# Patient Record
Sex: FEMALE | Race: WHITE | NOT HISPANIC OR LATINO
[De-identification: names, ages, dates, MRNs, and addresses within clinical notes are randomized per-mention and may not be internally consistent; named-entity substitution may affect disease eponyms.]

---

## 2017-02-21 ENCOUNTER — LABORATORY RESULT (OUTPATIENT)
Age: 32
End: 2017-02-21

## 2017-02-21 ENCOUNTER — APPOINTMENT (OUTPATIENT)
Dept: INTERNAL MEDICINE | Facility: CLINIC | Age: 32
End: 2017-02-21

## 2017-02-21 ENCOUNTER — OUTPATIENT (OUTPATIENT)
Dept: OUTPATIENT SERVICES | Facility: HOSPITAL | Age: 32
LOS: 1 days | End: 2017-02-21
Payer: MEDICAID

## 2017-02-21 VITALS
HEIGHT: 67 IN | DIASTOLIC BLOOD PRESSURE: 80 MMHG | BODY MASS INDEX: 34.84 KG/M2 | SYSTOLIC BLOOD PRESSURE: 120 MMHG | WEIGHT: 222 LBS

## 2017-02-21 VITALS — HEART RATE: 72 BPM

## 2017-02-21 DIAGNOSIS — L30.9 DERMATITIS, UNSPECIFIED: ICD-10-CM

## 2017-02-21 DIAGNOSIS — Z98.89 OTHER SPECIFIED POSTPROCEDURAL STATES: Chronic | ICD-10-CM

## 2017-02-21 DIAGNOSIS — I10 ESSENTIAL (PRIMARY) HYPERTENSION: ICD-10-CM

## 2017-02-21 LAB — HBA1C BLD-MCNC: 5.3 % — SIGNIFICANT CHANGE UP (ref 4–5.6)

## 2017-02-21 PROCEDURE — 87389 HIV-1 AG W/HIV-1&-2 AB AG IA: CPT

## 2017-02-21 PROCEDURE — 86780 TREPONEMA PALLIDUM: CPT

## 2017-02-21 PROCEDURE — 83036 HEMOGLOBIN GLYCOSYLATED A1C: CPT

## 2017-02-21 PROCEDURE — 86803 HEPATITIS C AB TEST: CPT

## 2017-02-21 PROCEDURE — 86706 HEP B SURFACE ANTIBODY: CPT

## 2017-02-21 PROCEDURE — 82306 VITAMIN D 25 HYDROXY: CPT

## 2017-02-21 PROCEDURE — 86704 HEP B CORE ANTIBODY TOTAL: CPT

## 2017-02-21 PROCEDURE — 84443 ASSAY THYROID STIM HORMONE: CPT

## 2017-02-21 PROCEDURE — 80061 LIPID PANEL: CPT

## 2017-02-21 PROCEDURE — 87340 HEPATITIS B SURFACE AG IA: CPT

## 2017-02-21 PROCEDURE — G0463: CPT

## 2017-02-22 LAB
24R-OH-CALCIDIOL SERPL-MCNC: 16.7 NG/ML — LOW (ref 30–100)
CHOLEST SERPL-MCNC: 185 MG/DL — SIGNIFICANT CHANGE UP (ref 10–199)
HBV CORE AB SER-ACNC: SIGNIFICANT CHANGE UP
HBV SURFACE AB SER-ACNC: ABNORMAL
HBV SURFACE AG SER-ACNC: SIGNIFICANT CHANGE UP
HCV AB S/CO SERPL IA: 0.1 S/CO — SIGNIFICANT CHANGE UP
HCV AB SERPL-IMP: SIGNIFICANT CHANGE UP
HDLC SERPL-MCNC: 53 MG/DL — SIGNIFICANT CHANGE UP (ref 40–125)
HIV 1+2 AB+HIV1 P24 AG SERPL QL IA: SIGNIFICANT CHANGE UP
LIPID PNL WITH DIRECT LDL SERPL: 117 MG/DL — SIGNIFICANT CHANGE UP
T PALLIDUM AB TITR SER: NEGATIVE — SIGNIFICANT CHANGE UP
T4 FREE+ TSH PNL SERPL: 2.12 UIU/ML — SIGNIFICANT CHANGE UP (ref 0.27–4.2)
TOTAL CHOLESTEROL/HDL RATIO MEASUREMENT: 3.5 RATIO — SIGNIFICANT CHANGE UP (ref 3.3–7.1)
TRIGL SERPL-MCNC: 75 MG/DL — SIGNIFICANT CHANGE UP (ref 10–149)

## 2017-02-23 LAB
C TRACH RRNA SPEC QL NAA+PROBE: SIGNIFICANT CHANGE UP
N GONORRHOEA RRNA SPEC QL NAA+PROBE: SIGNIFICANT CHANGE UP
SPECIMEN SOURCE: SIGNIFICANT CHANGE UP

## 2017-02-24 DIAGNOSIS — F34.1 DYSTHYMIC DISORDER: ICD-10-CM

## 2017-02-24 DIAGNOSIS — E55.9 VITAMIN D DEFICIENCY, UNSPECIFIED: ICD-10-CM

## 2017-02-24 DIAGNOSIS — Z00.00 ENCOUNTER FOR GENERAL ADULT MEDICAL EXAMINATION WITHOUT ABNORMAL FINDINGS: ICD-10-CM

## 2017-02-24 DIAGNOSIS — L30.9 DERMATITIS, UNSPECIFIED: ICD-10-CM

## 2017-02-24 DIAGNOSIS — F10.20 ALCOHOL DEPENDENCE, UNCOMPLICATED: ICD-10-CM

## 2017-04-12 ENCOUNTER — CHART COPY (OUTPATIENT)
Age: 32
End: 2017-04-12

## 2017-09-26 ENCOUNTER — OUTPATIENT (OUTPATIENT)
Dept: OUTPATIENT SERVICES | Facility: HOSPITAL | Age: 32
LOS: 1 days | End: 2017-09-26
Payer: MEDICAID

## 2017-09-26 ENCOUNTER — APPOINTMENT (OUTPATIENT)
Dept: INTERNAL MEDICINE | Facility: CLINIC | Age: 32
End: 2017-09-26
Payer: MEDICAID

## 2017-09-26 ENCOUNTER — LABORATORY RESULT (OUTPATIENT)
Age: 32
End: 2017-09-26

## 2017-09-26 VITALS
OXYGEN SATURATION: 98 % | HEART RATE: 97 BPM | DIASTOLIC BLOOD PRESSURE: 74 MMHG | SYSTOLIC BLOOD PRESSURE: 112 MMHG | HEIGHT: 67 IN | WEIGHT: 226 LBS | BODY MASS INDEX: 35.47 KG/M2

## 2017-09-26 DIAGNOSIS — Z98.89 OTHER SPECIFIED POSTPROCEDURAL STATES: Chronic | ICD-10-CM

## 2017-09-26 PROCEDURE — 82306 VITAMIN D 25 HYDROXY: CPT

## 2017-09-26 PROCEDURE — G0463: CPT

## 2017-09-26 PROCEDURE — 99213 OFFICE O/P EST LOW 20 MIN: CPT | Mod: GE

## 2017-09-27 DIAGNOSIS — I10 ESSENTIAL (PRIMARY) HYPERTENSION: ICD-10-CM

## 2017-09-27 LAB — 24R-OH-CALCIDIOL SERPL-MCNC: 26.1 NG/ML — LOW (ref 30–80)

## 2017-10-03 DIAGNOSIS — E55.9 VITAMIN D DEFICIENCY, UNSPECIFIED: ICD-10-CM

## 2017-12-06 ENCOUNTER — OTHER (OUTPATIENT)
Age: 32
End: 2017-12-06

## 2017-12-31 ENCOUNTER — EMERGENCY (EMERGENCY)
Facility: HOSPITAL | Age: 32
LOS: 0 days | Discharge: HOME | End: 2017-12-31
Attending: EMERGENCY MEDICINE | Admitting: EMERGENCY MEDICINE
Payer: MEDICAID

## 2017-12-31 VITALS
TEMPERATURE: 97 F | HEART RATE: 113 BPM | SYSTOLIC BLOOD PRESSURE: 133 MMHG | OXYGEN SATURATION: 99 % | RESPIRATION RATE: 18 BRPM | DIASTOLIC BLOOD PRESSURE: 71 MMHG

## 2017-12-31 VITALS — WEIGHT: 210.1 LBS | HEIGHT: 67 IN

## 2017-12-31 DIAGNOSIS — R06.02 SHORTNESS OF BREATH: ICD-10-CM

## 2017-12-31 DIAGNOSIS — R07.9 CHEST PAIN, UNSPECIFIED: ICD-10-CM

## 2017-12-31 DIAGNOSIS — Z98.89 OTHER SPECIFIED POSTPROCEDURAL STATES: Chronic | ICD-10-CM

## 2017-12-31 DIAGNOSIS — F43.9 REACTION TO SEVERE STRESS, UNSPECIFIED: ICD-10-CM

## 2017-12-31 DIAGNOSIS — F41.9 ANXIETY DISORDER, UNSPECIFIED: ICD-10-CM

## 2017-12-31 DIAGNOSIS — F32.9 MAJOR DEPRESSIVE DISORDER, SINGLE EPISODE, UNSPECIFIED: ICD-10-CM

## 2017-12-31 PROCEDURE — 93010 ELECTROCARDIOGRAM REPORT: CPT

## 2017-12-31 PROCEDURE — 71020: CPT | Mod: 26

## 2017-12-31 PROCEDURE — 99285 EMERGENCY DEPT VISIT HI MDM: CPT

## 2017-12-31 RX ORDER — IBUPROFEN 200 MG
600 TABLET ORAL ONCE
Qty: 0 | Refills: 0 | Status: COMPLETED | OUTPATIENT
Start: 2017-12-31 | End: 2017-12-31

## 2017-12-31 RX ADMIN — Medication 600 MILLIGRAM(S): at 18:14
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
## 2017-12-31 NOTE — ED STATDOCS - NS_ ATTENDINGSCRIBEDETAILS _ED_A_ED_FT
I, Sumeet Fernández MD,  performed the initial face to face bedside interview with this patient regarding history of present illness, review of symptoms and relevant past medical, social and family history.  I completed an independent physical examination.    The history, relevant review of systems, past medical and surgical history, medical decision making, and physical examination was documented by the scribe in my presence and I attest to the accuracy of the documentation.
F32.3
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
## 2018-01-30 ENCOUNTER — EMERGENCY (EMERGENCY)
Facility: HOSPITAL | Age: 33
LOS: 0 days | Discharge: HOME | End: 2018-01-30
Attending: EMERGENCY MEDICINE | Admitting: EMERGENCY MEDICINE
Payer: MEDICAID

## 2018-01-30 VITALS
SYSTOLIC BLOOD PRESSURE: 121 MMHG | HEART RATE: 89 BPM | DIASTOLIC BLOOD PRESSURE: 79 MMHG | TEMPERATURE: 98 F | OXYGEN SATURATION: 100 %

## 2018-01-30 VITALS
HEIGHT: 67 IN | DIASTOLIC BLOOD PRESSURE: 91 MMHG | WEIGHT: 169.98 LBS | SYSTOLIC BLOOD PRESSURE: 147 MMHG | OXYGEN SATURATION: 100 % | RESPIRATION RATE: 18 BRPM | TEMPERATURE: 98 F | HEART RATE: 102 BPM

## 2018-01-30 DIAGNOSIS — F32.9 MAJOR DEPRESSIVE DISORDER, SINGLE EPISODE, UNSPECIFIED: ICD-10-CM

## 2018-01-30 DIAGNOSIS — F41.9 ANXIETY DISORDER, UNSPECIFIED: ICD-10-CM

## 2018-01-30 DIAGNOSIS — Z98.89 OTHER SPECIFIED POSTPROCEDURAL STATES: Chronic | ICD-10-CM

## 2018-01-30 PROCEDURE — 71046 X-RAY EXAM CHEST 2 VIEWS: CPT | Mod: 26

## 2018-01-30 PROCEDURE — 99283 EMERGENCY DEPT VISIT LOW MDM: CPT

## 2018-01-30 PROCEDURE — 93010 ELECTROCARDIOGRAM REPORT: CPT
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
## 2018-01-30 NOTE — ED STATDOCS - PROGRESS NOTE DETAILS
JW Sx resolved in the Ed no chest pain or SOB.  EKG reveals no evidence of ACS. No fever or EKG findings suggestive of pericarditis, or myocarditis.  No Lamas's Triad or signs of pericardial tamponade.  No clinical findings suggestive of  pulmonary embolism.  CXR normal with clear lungs, normal mediastinum, and bilateral breath sounds.  No findings suggestive of pneumothorax, pneumonia, or esophageal perforation.  Historically pain not abrupt in onset, not tearing or ripping, pulses are symmetric, no murmur noted, no clinical findings suggestive of aortic dissection. Physical Exam: General: alert and oriented x3 in no acute distress.  Cardiovascular: Regular rate and rhythm, S1 and S2 normal.  No murmurs, rubs, or gallops.  Pulses equal bilaterally.  No carotid bruits.  No peripheral edema or JVD.  Respiratory: No respiratory distress.  Lungs clear to auscultation bilaterally without adventitial breath sounds.  Abdominal: Non-distended, normal bowel sounds in all four quadrants, non tender to percussion and palpation in all four quadrants.  No mass, rigidity, or guarding.  Extremities: No sign of trauma, inflammation, or effusion.  Full range of motion in the cervical, lumbar, and thoracic spine and all four extremities without limitation.  No peripheral edema.  Neurological: AOx3 NAD.  Cranial nerves I-XII intact.  Normal gross motor and sensory function. Pt ambulatory. 5/5 muscular strength with normal bulk and tone.  DTRs 2+ bilaterally.  No dysmetria or dysdiadochokinesia.  No focal neurological deficit.  No neck stiffness, photophobia, rash, or meningismus.   A/P Anxiety.  EKG/CXR WNL. Pt instructed to continue normal medication regimen and follow up with psychiatrist in 24 hours. Pt instructed to follow up with cardiologist in the next 24 hours for a repeat evaluation.  I reviewed the alarm symptoms of this patient's diagnosis including chest pain, SOB, nausea, and discussed criteria for their return to the emergency department.  I instructed the patient to return to the emergency department with any alarm symptoms for their specific diagnosis including any worsening symptoms, and any other concerns.  I instructed this patient to call their primary doctor today, to inform them of their visit to the emergency department, and to obtain a repeat evaluation in the next 24 hours.  This patient understood and agreed with our plan for follow up and felt safe returning home.  At the time of discharge this patient remained in stable condition, in no acute distress, with stable vital signs. JW Sx resolved in the Ed no chest pain or SOB.  Pt denies SI, HI.  Pt denies injuries.  Pt feels safe returning home.  EKG reveals no evidence of ACS. No fever or EKG findings suggestive of pericarditis, or myocarditis.  No Lamas's Triad or signs of pericardial tamponade.  No clinical findings suggestive of  pulmonary embolism.  CXR normal with clear lungs, normal mediastinum, and bilateral breath sounds.  No findings suggestive of pneumothorax, pneumonia, or esophageal perforation.  Historically pain not abrupt in onset, not tearing or ripping, pulses are symmetric, no murmur noted, no clinical findings suggestive of aortic dissection. Physical Exam: General: alert and oriented x3 in no acute distress.  Cardiovascular: Regular rate and rhythm, S1 and S2 normal.  No murmurs, rubs, or gallops.  Pulses equal bilaterally.  No carotid bruits.  No peripheral edema or JVD.  Respiratory: No respiratory distress.  Lungs clear to auscultation bilaterally without adventitial breath sounds.  Abdominal: Non-distended, normal bowel sounds in all four quadrants, non tender to percussion and palpation in all four quadrants.  No mass, rigidity, or guarding.  Extremities: No sign of trauma, inflammation, or effusion.  Full range of motion in the cervical, lumbar, and thoracic spine and all four extremities without limitation.  No peripheral edema.  Neurological: AOx3 NAD.  Cranial nerves I-XII intact.  Normal gross motor and sensory function. Pt ambulatory. 5/5 muscular strength with normal bulk and tone.  DTRs 2+ bilaterally.  No dysmetria or dysdiadochokinesia.  No focal neurological deficit.  No neck stiffness, photophobia, rash, or meningismus.   A/P Anxiety.  EKG/CXR WNL. Pt instructed to continue normal medication regimen and follow up with psychiatrist in 24 hours.
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
## 2018-02-20 ENCOUNTER — APPOINTMENT (OUTPATIENT)
Dept: INTERNAL MEDICINE | Facility: CLINIC | Age: 33
End: 2018-02-20
Payer: MEDICAID

## 2018-02-20 ENCOUNTER — OUTPATIENT (OUTPATIENT)
Dept: OUTPATIENT SERVICES | Facility: HOSPITAL | Age: 33
LOS: 1 days | End: 2018-02-20
Payer: MEDICAID

## 2018-02-20 VITALS
HEART RATE: 77 BPM | BODY MASS INDEX: 35.79 KG/M2 | WEIGHT: 228 LBS | DIASTOLIC BLOOD PRESSURE: 78 MMHG | HEIGHT: 67 IN | SYSTOLIC BLOOD PRESSURE: 118 MMHG | OXYGEN SATURATION: 98 %

## 2018-02-20 DIAGNOSIS — I10 ESSENTIAL (PRIMARY) HYPERTENSION: ICD-10-CM

## 2018-02-20 DIAGNOSIS — F34.1 DYSTHYMIC DISORDER: ICD-10-CM

## 2018-02-20 DIAGNOSIS — E55.9 VITAMIN D DEFICIENCY, UNSPECIFIED: ICD-10-CM

## 2018-02-20 DIAGNOSIS — F10.21 ALCOHOL DEPENDENCE, IN REMISSION: ICD-10-CM

## 2018-02-20 DIAGNOSIS — Z98.89 OTHER SPECIFIED POSTPROCEDURAL STATES: Chronic | ICD-10-CM

## 2018-02-20 DIAGNOSIS — E66.9 OBESITY, UNSPECIFIED: ICD-10-CM

## 2018-02-20 PROCEDURE — 99213 OFFICE O/P EST LOW 20 MIN: CPT | Mod: GE

## 2018-02-20 PROCEDURE — G0463: CPT

## 2018-02-21 PROBLEM — F10.21 HISTORY OF ALCOHOLISM: Status: ACTIVE | Noted: 2018-02-21

## 2018-03-06 ENCOUNTER — APPOINTMENT (OUTPATIENT)
Dept: INTERNAL MEDICINE | Facility: CLINIC | Age: 33
End: 2018-03-06

## 2018-03-06 ENCOUNTER — OUTPATIENT (OUTPATIENT)
Dept: OUTPATIENT SERVICES | Facility: HOSPITAL | Age: 33
LOS: 1 days | End: 2018-03-06
Payer: MEDICAID

## 2018-03-06 DIAGNOSIS — Z98.89 OTHER SPECIFIED POSTPROCEDURAL STATES: Chronic | ICD-10-CM

## 2018-03-06 DIAGNOSIS — I10 ESSENTIAL (PRIMARY) HYPERTENSION: ICD-10-CM

## 2018-03-06 PROCEDURE — G0463: CPT

## 2018-03-20 DIAGNOSIS — E66.9 OBESITY, UNSPECIFIED: ICD-10-CM

## 2018-06-12 ENCOUNTER — OUTPATIENT (OUTPATIENT)
Dept: OUTPATIENT SERVICES | Facility: HOSPITAL | Age: 33
LOS: 1 days | End: 2018-06-12
Payer: MEDICAID

## 2018-06-12 ENCOUNTER — APPOINTMENT (OUTPATIENT)
Dept: INTERNAL MEDICINE | Facility: CLINIC | Age: 33
End: 2018-06-12

## 2018-06-12 DIAGNOSIS — Z98.89 OTHER SPECIFIED POSTPROCEDURAL STATES: Chronic | ICD-10-CM

## 2018-06-12 DIAGNOSIS — I10 ESSENTIAL (PRIMARY) HYPERTENSION: ICD-10-CM

## 2018-06-12 PROCEDURE — G0463: CPT

## 2018-06-18 DIAGNOSIS — E66.9 OBESITY, UNSPECIFIED: ICD-10-CM

## 2018-08-14 ENCOUNTER — OUTPATIENT (OUTPATIENT)
Dept: OUTPATIENT SERVICES | Facility: HOSPITAL | Age: 33
LOS: 1 days | End: 2018-08-14
Payer: MEDICAID

## 2018-08-14 ENCOUNTER — APPOINTMENT (OUTPATIENT)
Dept: INTERNAL MEDICINE | Facility: CLINIC | Age: 33
End: 2018-08-14

## 2018-08-14 DIAGNOSIS — Z98.89 OTHER SPECIFIED POSTPROCEDURAL STATES: Chronic | ICD-10-CM

## 2018-08-14 DIAGNOSIS — I10 ESSENTIAL (PRIMARY) HYPERTENSION: ICD-10-CM

## 2018-08-14 PROCEDURE — G0463: CPT

## 2018-08-20 DIAGNOSIS — E66.9 OBESITY, UNSPECIFIED: ICD-10-CM

## 2018-10-24 ENCOUNTER — TRANSCRIPTION ENCOUNTER (OUTPATIENT)
Age: 33
End: 2018-10-24

## 2018-10-30 ENCOUNTER — APPOINTMENT (OUTPATIENT)
Dept: DERMATOLOGY | Facility: CLINIC | Age: 33
End: 2018-10-30
Payer: MEDICAID

## 2018-10-30 VITALS — BODY MASS INDEX: 35.79 KG/M2 | WEIGHT: 228 LBS | HEIGHT: 67 IN

## 2018-10-30 DIAGNOSIS — L23.9 ALLERGIC CONTACT DERMATITIS, UNSPECIFIED CAUSE: ICD-10-CM

## 2018-10-30 PROCEDURE — 99203 OFFICE O/P NEW LOW 30 MIN: CPT

## 2018-10-30 RX ORDER — HYDROCORTISONE 25 MG/G
2.5 CREAM TOPICAL TWICE DAILY
Qty: 1 | Refills: 0 | Status: DISCONTINUED | COMMUNITY
Start: 2017-02-21 | End: 2018-10-30

## 2018-11-02 ENCOUNTER — APPOINTMENT (OUTPATIENT)
Dept: INTERNAL MEDICINE | Facility: CLINIC | Age: 33
End: 2018-11-02

## 2018-12-11 ENCOUNTER — APPOINTMENT (OUTPATIENT)
Dept: INTERNAL MEDICINE | Facility: CLINIC | Age: 33
End: 2018-12-11

## 2018-12-19 ENCOUNTER — TRANSCRIPTION ENCOUNTER (OUTPATIENT)
Age: 33
End: 2018-12-19

## 2018-12-24 ENCOUNTER — TRANSCRIPTION ENCOUNTER (OUTPATIENT)
Age: 33
End: 2018-12-24

## 2018-12-26 NOTE — DISCUSSION/SUMMARY
[Home] : patient was discharged to home [FreeTextEntry1] : Prescribed amoxicillin 875 mg q12hrs 10 day course (from Urgent care). \par Given tessalon perles and flonase suspension also PRN for symptoms. \par \par Can follow up as needed with PMD.

## 2019-01-13 ENCOUNTER — TRANSCRIPTION ENCOUNTER (OUTPATIENT)
Age: 34
End: 2019-01-13

## 2019-01-14 NOTE — DISCUSSION/SUMMARY
[FreeTextEntry1] : Urgent Care visit 1/13\par Pt presented with 4 wks of moderate cough, sore throat, and nasal congestion. Previously seen at  12/24 and given amoxicillin. Pt started on Flonase, Tessalon Perles, and Proventil HFA Aerosol Solution PRN.  X ray unremarkable. Increase oral fluid Tylenol or Motrin as needed humidifier at night.\par Pt to followup with PCP.

## 2019-03-12 ENCOUNTER — OUTPATIENT (OUTPATIENT)
Dept: OUTPATIENT SERVICES | Facility: HOSPITAL | Age: 34
LOS: 1 days | End: 2019-03-12
Payer: MEDICAID

## 2019-03-12 ENCOUNTER — APPOINTMENT (OUTPATIENT)
Dept: INTERNAL MEDICINE | Facility: CLINIC | Age: 34
End: 2019-03-12
Payer: MEDICAID

## 2019-03-12 DIAGNOSIS — Z98.89 OTHER SPECIFIED POSTPROCEDURAL STATES: Chronic | ICD-10-CM

## 2019-03-12 DIAGNOSIS — I10 ESSENTIAL (PRIMARY) HYPERTENSION: ICD-10-CM

## 2019-03-12 PROCEDURE — 97803 MED NUTRITION INDIV SUBSEQ: CPT

## 2019-03-13 DIAGNOSIS — E66.9 OBESITY, UNSPECIFIED: ICD-10-CM

## 2019-03-21 ENCOUNTER — OUTPATIENT (OUTPATIENT)
Dept: OUTPATIENT SERVICES | Facility: HOSPITAL | Age: 34
LOS: 1 days | End: 2019-03-21
Payer: MEDICAID

## 2019-03-21 ENCOUNTER — APPOINTMENT (OUTPATIENT)
Dept: INTERNAL MEDICINE | Facility: CLINIC | Age: 34
End: 2019-03-21
Payer: MEDICAID

## 2019-03-21 VITALS — SYSTOLIC BLOOD PRESSURE: 105 MMHG | DIASTOLIC BLOOD PRESSURE: 80 MMHG

## 2019-03-21 DIAGNOSIS — Z81.1 FAMILY HISTORY OF ALCOHOL ABUSE AND DEPENDENCE: ICD-10-CM

## 2019-03-21 DIAGNOSIS — Z98.89 OTHER SPECIFIED POSTPROCEDURAL STATES: Chronic | ICD-10-CM

## 2019-03-21 DIAGNOSIS — I10 ESSENTIAL (PRIMARY) HYPERTENSION: ICD-10-CM

## 2019-03-21 DIAGNOSIS — E66.9 OBESITY, UNSPECIFIED: ICD-10-CM

## 2019-03-21 PROCEDURE — G0463: CPT

## 2019-03-21 PROCEDURE — 99213 OFFICE O/P EST LOW 20 MIN: CPT | Mod: GE

## 2019-03-21 RX ORDER — ESCITALOPRAM OXALATE 20 MG/1
20 TABLET, FILM COATED ORAL
Refills: 0 | Status: DISCONTINUED | COMMUNITY
End: 2019-03-21

## 2019-03-21 RX ORDER — HYDROCORTISONE 25 MG/G
2.5 OINTMENT TOPICAL TWICE DAILY
Qty: 30 | Refills: 1 | Status: DISCONTINUED | COMMUNITY
Start: 2018-10-30 | End: 2019-03-21

## 2019-03-21 RX ORDER — TRIAMCINOLONE ACETONIDE 1 MG/G
0.1 OINTMENT TOPICAL
Qty: 1 | Refills: 0 | Status: DISCONTINUED | COMMUNITY
Start: 2018-10-30 | End: 2019-03-21
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
## 2019-03-21 NOTE — PHYSICAL EXAM
[No Acute Distress] : no acute distress [Normal Sclera/Conjunctiva] : normal sclera/conjunctiva [EOMI] : extra ocular movement intact [Normal Oropharynx] : the oropharynx was normal [Supple] : the neck was supple [No Respiratory Distress] : no respiratory distress [Clear to Auscultation] : lungs were clear to auscultation bilaterally [Normal Rate] : heart rate was normal  [Regular Rhythm] : with a regular rhythm [Normal S1, S2] : normal S1 and S2 [No Murmurs] : no murmurs heard [No Edema] : there was no peripheral edema [Non Tender] : non-tender [Soft] : abdomen soft [Not Distended] : not distended [Normal Supraclavicular Nodes] : no supraclavicular lymphadenopathy [Normal Post Cervical Nodes] : no posterior cervical lymphadenopathy [Normal Anterior Cervical Nodes] : no anterior cervical lymphadenopathy [No Spinal Tenderness] : no spinal tenderness [Normal Gait] : normal gait [No Rash] : no rash [No Motor Deficits] : the motor exam was normal [No Gross Sensory Deficits] : no gross sensory deficits [Normal Affect] : the affect was normal [Normal Mood] : the mood was normal

## 2019-03-21 NOTE — REVIEW OF SYSTEMS
[Fever] : no fever [Chills] : no chills [Eye Pain] : no eye pain [Earache] : no earache [Hoarseness] : no hoarseness [Chest Pain] : no chest pain [Lower Ext Edema] : no lower extremity edema [Shortness Of Breath] : no shortness of breath [Cough] : no cough [Abdominal Pain] : no abdominal pain [Nausea] : no nausea [Constipation] : no constipation [Diarrhea] : no diarrhea [Dysuria] : no dysuria [Joint Pain] : no joint pain [Skin Rash] : no skin rash [Headache] : no headaches [Suicidal] : not suicidal [Easy Bleeding] : no tendency for easy bleeding

## 2019-03-21 NOTE — ASSESSMENT
[FreeTextEntry1] : 33F w/ Autism, dysthymia on escitalopram, prior alcohol abuse, vitD deficiency presents for CPE:\par \par #dysthymia- stable\par -c/w escitalopram\par \par #h/o ETOH abuse- last drink 2010 \par -c/w outpt psych f/u\par \par #Obesity\par -continue with exercise at gym, encouraged patient to limit fast food in diet\par -nutritionist follow up\par \par #HCM\par -HIV and hep C negative \par -pap with HPV negative one year ago, c/w outpt gyn f/u and repeat  pap in 4 years \par -declined flu shot\par \par RTC 6 months

## 2019-03-22 DIAGNOSIS — E55.9 VITAMIN D DEFICIENCY, UNSPECIFIED: ICD-10-CM

## 2019-03-22 DIAGNOSIS — F10.20 ALCOHOL DEPENDENCE, UNCOMPLICATED: ICD-10-CM

## 2019-03-22 DIAGNOSIS — Z81.1 FAMILY HISTORY OF ALCOHOL ABUSE AND DEPENDENCE: ICD-10-CM

## 2019-03-22 DIAGNOSIS — E66.9 OBESITY, UNSPECIFIED: ICD-10-CM

## 2019-03-22 DIAGNOSIS — F34.1 DYSTHYMIC DISORDER: ICD-10-CM

## 2019-03-22 LAB
BASOPHILS # BLD AUTO: 0.04 K/UL
BASOPHILS NFR BLD AUTO: 0.4 %
CHOLEST SERPL-MCNC: 179 MG/DL
CHOLEST/HDLC SERPL: 4.3 RATIO
EOSINOPHIL # BLD AUTO: 0.17 K/UL
EOSINOPHIL NFR BLD AUTO: 1.7 %
HBA1C MFR BLD HPLC: 5 %
HCT VFR BLD CALC: 41.4 %
HDLC SERPL-MCNC: 42 MG/DL
HGB BLD-MCNC: 13.2 G/DL
IMM GRANULOCYTES NFR BLD AUTO: 0.6 %
LDLC SERPL CALC-MCNC: 77 MG/DL
LYMPHOCYTES # BLD AUTO: 2.85 K/UL
LYMPHOCYTES NFR BLD AUTO: 27.7 %
MAN DIFF?: NORMAL
MCHC RBC-ENTMCNC: 31.1 PG
MCHC RBC-ENTMCNC: 31.9 GM/DL
MCV RBC AUTO: 97.6 FL
MONOCYTES # BLD AUTO: 1.17 K/UL
MONOCYTES NFR BLD AUTO: 11.4 %
NEUTROPHILS # BLD AUTO: 6.01 K/UL
NEUTROPHILS NFR BLD AUTO: 58.2 %
PLATELET # BLD AUTO: 404 K/UL
RBC # BLD: 4.24 M/UL
RBC # FLD: 13.2 %
TRIGL SERPL-MCNC: 302 MG/DL
WBC # FLD AUTO: 10.3 K/UL

## 2019-05-07 ENCOUNTER — EMERGENCY (EMERGENCY)
Facility: HOSPITAL | Age: 34
LOS: 0 days | Discharge: HOME | End: 2019-05-07
Attending: EMERGENCY MEDICINE | Admitting: EMERGENCY MEDICINE
Payer: MEDICAID

## 2019-05-07 VITALS — WEIGHT: 149.91 LBS | HEIGHT: 67 IN

## 2019-05-07 VITALS — OXYGEN SATURATION: 100 % | RESPIRATION RATE: 17 BRPM | HEART RATE: 85 BPM

## 2019-05-07 DIAGNOSIS — F41.9 ANXIETY DISORDER, UNSPECIFIED: ICD-10-CM

## 2019-05-07 DIAGNOSIS — Z98.89 OTHER SPECIFIED POSTPROCEDURAL STATES: Chronic | ICD-10-CM
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
## 2019-05-07 DIAGNOSIS — F32.9 MAJOR DEPRESSIVE DISORDER, SINGLE EPISODE, UNSPECIFIED: ICD-10-CM

## 2019-05-07 DIAGNOSIS — Z79.899 OTHER LONG TERM (CURRENT) DRUG THERAPY: ICD-10-CM

## 2019-05-07 DIAGNOSIS — S80.02XA CONTUSION OF LEFT KNEE, INITIAL ENCOUNTER: ICD-10-CM
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
## 2019-05-07 PROCEDURE — 73562 X-RAY EXAM OF KNEE 3: CPT | Mod: 26,LT

## 2019-05-07 PROCEDURE — 99283 EMERGENCY DEPT VISIT LOW MDM: CPT | Mod: 25

## 2019-05-07 PROCEDURE — 29505 APPLICATION LONG LEG SPLINT: CPT | Mod: LT

## 2019-05-07 RX ORDER — IBUPROFEN 200 MG
600 TABLET ORAL ONCE
Qty: 0 | Refills: 0 | Status: COMPLETED | OUTPATIENT
Start: 2019-05-07 | End: 2019-05-07

## 2019-05-07 RX ADMIN — Medication 600 MILLIGRAM(S): at 23:13

## 2019-05-07 NOTE — ED PROVIDER NOTE - CARE PROVIDER_API CALL
[facility])  Orthopaedic Surgery  [address] [place] [address] [place] [address]  Phone: [phone]  Fax: [phone]  Follow Up Time:

## 2019-05-07 NOTE — ED ADULT TRIAGE NOTE - CHIEF COMPLAINT QUOTE
pt reports falling out of bed last night and hitting left knee, pt reports swelling and pain 6/10, pt denies head trauma, LOC, dizziness, fatigue or weakness

## 2019-05-07 NOTE — ED PROVIDER NOTE - MUSCULOSKELETAL, MLM
Normal gait.  Tender left knee over anterior patella with swelling and no ecchymosis.  FROM without limitation.  No appreciable laxity.  DP 2+

## 2019-05-07 NOTE — ED ADULT NURSE NOTE - NSIMPLEMENTINTERV_GEN_ALL_ED
Sibling  Still living? Yes, Estimated age: Age Unknown  Family history of bipolar disorder, Age at diagnosis: Age Unknown Implemented All Universal Safety Interventions:  [place] to call system. Call bell, personal items and telephone within reach. Instruct patient to call for assistance. Room bathroom lighting operational. Non-slip footwear when patient is off stretcher. Physically safe environment: no spills, clutter or unnecessary equipment. Stretcher in lowest position, wheels locked, appropriate side rails in place.

## 2019-05-07 NOTE — ED PROVIDER NOTE - OBJECTIVE STATEMENT
32 yo female with h/o anxiety and depression, on Lexapro, c/o knee pain.  Patient states that last night she rolled out of bed while sleeping.  Woke up, went back to bed.  This morning noticed the left knee hurt.  All day, the knee has bothered her, despite ice.  Did not take anything for the pain otherwise.  Denies any other pain or injury.  Ambulatory without difficulty.

## 2019-05-07 NOTE — ED ADULT NURSE NOTE - OBJECTIVE STATEMENT
pt presents to ed ambulatory complaining of left knee pain. pt states she fell out of bed Monday night and hit left knee. pt endorses pain all day despite ice, pt denies taking any pain medication. pt has no pertinent medical hx. pt is ambulatory, pt is alert and oriented x3, vitals stable, denies numbness/tingling.

## 2019-05-07 NOTE — ED PROVIDER NOTE - NSFOLLOWUPINSTRUCTIONS_ED_ALL_ED_FT
Use knee immobilizer and crutches to reduce weight bearing  Return to the ER for any worsening symptoms or any other concerns  Ibuprofen 600mg every 6 hours for pain  Ice  Follow up with orthopedics this week

## 2019-05-23 ENCOUNTER — OUTPATIENT (OUTPATIENT)
Dept: OUTPATIENT SERVICES | Facility: HOSPITAL | Age: 34
LOS: 1 days | End: 2019-05-23
Payer: MEDICAID

## 2019-05-23 ENCOUNTER — APPOINTMENT (OUTPATIENT)
Dept: INTERNAL MEDICINE | Facility: CLINIC | Age: 34
End: 2019-05-23
Payer: MEDICAID

## 2019-05-23 VITALS
DIASTOLIC BLOOD PRESSURE: 70 MMHG | SYSTOLIC BLOOD PRESSURE: 114 MMHG | WEIGHT: 219 LBS | HEIGHT: 67 IN | BODY MASS INDEX: 34.37 KG/M2 | HEART RATE: 87 BPM | OXYGEN SATURATION: 98 %

## 2019-05-23 DIAGNOSIS — I10 ESSENTIAL (PRIMARY) HYPERTENSION: ICD-10-CM

## 2019-05-23 DIAGNOSIS — Z98.89 OTHER SPECIFIED POSTPROCEDURAL STATES: Chronic | ICD-10-CM

## 2019-05-23 PROCEDURE — G0463: CPT

## 2019-05-23 PROCEDURE — 99213 OFFICE O/P EST LOW 20 MIN: CPT | Mod: GE

## 2019-05-23 NOTE — PHYSICAL EXAM
[No Acute Distress] : no acute distress [Well Nourished] : well nourished [Well Developed] : well developed [Normal Sclera/Conjunctiva] : normal sclera/conjunctiva [PERRL] : pupils equal round and reactive to light [Normal Outer Ear/Nose] : the outer ears and nose were normal in appearance [Supple] : supple [No Respiratory Distress] : no respiratory distress  [Clear to Auscultation] : lungs were clear to auscultation bilaterally [No Accessory Muscle Use] : no accessory muscle use [Normal Rate] : normal rate  [Regular Rhythm] : with a regular rhythm [No Edema] : there was no peripheral edema [Soft] : abdomen soft [Non Tender] : non-tender [de-identified] : mild L knee swelling, normal ROM [de-identified] : b/l UE erythema 2/2 sunburn

## 2019-05-23 NOTE — HISTORY OF PRESENT ILLNESS
[FreeTextEntry8] : 33F w/ autism, dysthymia on escitalopram, prior alcohol abuse, vitD deficiency presents for post ED follow up. Pt seen in [facility] ED on 5/7 after falling out of her bed and hitting her left knee. Xray performed that showed no fracture, effusion, or soft tissue swelling. Pt sent home on ibuprofen 600 mg q6 prn and knee brace which she finished wearing yesterday. Diagnosed w/ knee contusion. \par \par Since then, pt states that pain and swelling have improved. She is able to walk without issues. Pt has form to be filled out for agency and asking for letter to return to school. \par \par Pt also has significant sunburn on both arms from going to [employer] game yesterday, did not wear sunscreen.

## 2019-05-23 NOTE — PLAN
[FreeTextEntry1] : 33F w/ autism, dysthymia on escitalopram, prior alcohol abuse, vitD deficiency presents for post ED follow up.\par \par # L knee contusion. Pain and swelling have resolved\par - continue ibuprofen 200 mg q6 as needed, ice, rest\par - signed form for agency \par - provided letter for return to school\par \par Lacy Nicolas PGY1

## 2019-05-23 NOTE — REVIEW OF SYSTEMS
[Joint Swelling] : joint swelling [Skin Rash] : skin rash [Fever] : no fever [Chills] : no chills [Vision Problems] : no vision problems [Nasal Discharge] : no nasal discharge [Sore Throat] : no sore throat [Chest Pain] : no chest pain [Palpitations] : no palpitations [Shortness Of Breath] : no shortness of breath [Abdominal Pain] : no abdominal pain [Dysuria] : no dysuria [Joint Pain] : no joint pain [Muscle Pain] : no muscle pain [de-identified] : sunburn

## 2019-05-29 DIAGNOSIS — S80.02XA CONTUSION OF LEFT KNEE, INITIAL ENCOUNTER: ICD-10-CM

## 2019-06-06 NOTE — ED PROVIDER NOTE - CARE PROVIDERS DIRECT ADDRESSES
[phone] patient called back to let me know she went out and purchased 2 different pregnancy tests and one is positive and the other one is negative. I advised patient I will order a blood pregnancy test. Patient verbalized understanding. HCG quant ordered.    ,DirectAddress_Unknown

## 2019-06-13 ENCOUNTER — APPOINTMENT (OUTPATIENT)
Dept: INTERNAL MEDICINE | Facility: CLINIC | Age: 34
End: 2019-06-13

## 2019-10-17 ENCOUNTER — EMERGENCY (EMERGENCY)
Facility: HOSPITAL | Age: 34
LOS: 0 days | Discharge: HOME | End: 2019-10-17
Attending: EMERGENCY MEDICINE
Payer: MEDICAID

## 2019-10-17 VITALS
SYSTOLIC BLOOD PRESSURE: 127 MMHG | HEART RATE: 84 BPM | RESPIRATION RATE: 18 BRPM | TEMPERATURE: 98 F | DIASTOLIC BLOOD PRESSURE: 74 MMHG | OXYGEN SATURATION: 97 %

## 2019-10-17 VITALS — HEIGHT: 67 IN | WEIGHT: 210.1 LBS

## 2019-10-17 DIAGNOSIS — F32.9 MAJOR DEPRESSIVE DISORDER, SINGLE EPISODE, UNSPECIFIED: ICD-10-CM

## 2019-10-17 DIAGNOSIS — M77.8 OTHER ENTHESOPATHIES, NOT ELSEWHERE CLASSIFIED: ICD-10-CM

## 2019-10-17 DIAGNOSIS — Z98.89 OTHER SPECIFIED POSTPROCEDURAL STATES: Chronic | ICD-10-CM

## 2019-10-17 DIAGNOSIS — Z98.890 OTHER SPECIFIED POSTPROCEDURAL STATES: ICD-10-CM

## 2019-10-17 DIAGNOSIS — F41.9 ANXIETY DISORDER, UNSPECIFIED: ICD-10-CM

## 2019-10-17 DIAGNOSIS — M25.531 PAIN IN RIGHT WRIST: ICD-10-CM

## 2019-10-17 PROCEDURE — 73110 X-RAY EXAM OF WRIST: CPT | Mod: 26,RT

## 2019-10-17 PROCEDURE — 73110 X-RAY EXAM OF WRIST: CPT | Mod: RT

## 2019-10-17 PROCEDURE — 99283 EMERGENCY DEPT VISIT LOW MDM: CPT

## 2019-10-17 PROCEDURE — 99283 EMERGENCY DEPT VISIT LOW MDM: CPT | Mod: 25

## 2019-10-17 RX ORDER — IBUPROFEN 200 MG
600 TABLET ORAL ONCE
Refills: 0 | Status: COMPLETED | OUTPATIENT
Start: 2019-10-17 | End: 2019-10-17

## 2019-10-17 RX ADMIN — Medication 600 MILLIGRAM(S): at 21:53

## 2019-10-17 NOTE — ED STATDOCS - CARE PROVIDER_API CALL
[facility])  Orthopaedic Surgery; Surgery of the Hand  [address] [place] [address]  Phone: [phone]  Fax: [phone]  Follow Up Time:

## 2019-10-17 NOTE — ED STATDOCS - PROGRESS NOTE DETAILS
34 y/o Female reports to ED c/o pain to right wrist s/p feeling pop to R arm.  Neg falls.   Xray reviewed with Dr. Aguirre.  Neg for Fx.  Dr. Aguirre applied Wrist splint.  Will refer to Ortho.  Maria E Avina PA-C

## 2019-10-17 NOTE — ED STATDOCS - BIRTH SEX
Unknown Scc Well Differentiated Histology Text: There are irregular masses of epidermal cells in the upper dermis.  Within the tumor masses, there are varying proportions of normal squamous cells and anaplastic squamous cells.  The anaplastic cells have variation in size and shape with hyperplasia and hyperchromasia of the nuclei, absence of intracellular bridges and varying degrees of keratinization.  A well differentiated pattern is noted.
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
## 2019-10-17 NOTE — ED STATDOCS - PATIENT PORTAL LINK FT
You can access the FollowMyHealth Patient Portal offered by [facility] by registering at the following website: [email or web address]. By joining [facility]’s FollowMyHealth portal, you will also be able to view your health information using other applications (apps) compatible with our system.

## 2019-10-17 NOTE — ED STATDOCS - ATTENDING CONTRIBUTION TO CARE
Attending Contribution to Care: I, Kaylie Aguirre, performed the initial face to face bedside interview with this patient regarding history of present illness, review of symptoms and relevant past medical, social and family history.  I completed an independent physical examination.  I was the initial provider who evaluated this patient. I have signed out the follow up of any pending tests (i.e. labs, radiological studies) to the ACP.  I have communicated the patient’s plan of care and disposition with the ACP.

## 2019-10-17 NOTE — ED ADULT NURSE NOTE - OBJECTIVE STATEMENT
Patient has pain to right wrist she felt a "pop" yesterday.  Patient did not fall or injure wrist.  Wrist with no edema, fingers warm and mobile, Capillary refill less than 2 seconds.

## 2019-10-17 NOTE — ED STATDOCS - NSFOLLOWUPINSTRUCTIONS_ED_ALL_ED_FT
See printed instructions on tendonitis.  Rest, ice, elevate as instructed and keep splint on until you see your doctor or an orthopedic specialist.

## 2019-10-18 ENCOUNTER — RX RENEWAL (OUTPATIENT)
Age: 34
End: 2019-10-18

## 2019-11-05 ENCOUNTER — APPOINTMENT (OUTPATIENT)
Dept: INTERNAL MEDICINE | Facility: CLINIC | Age: 34
End: 2019-11-05
Payer: MEDICAID

## 2019-11-05 ENCOUNTER — OUTPATIENT (OUTPATIENT)
Dept: OUTPATIENT SERVICES | Facility: HOSPITAL | Age: 34
LOS: 1 days | End: 2019-11-05
Payer: MEDICAID

## 2019-11-05 DIAGNOSIS — Z98.89 OTHER SPECIFIED POSTPROCEDURAL STATES: Chronic | ICD-10-CM

## 2019-11-05 DIAGNOSIS — M12.531 TRAUMATIC ARTHROPATHY, RIGHT WRIST: ICD-10-CM

## 2019-11-05 DIAGNOSIS — F34.1 DYSTHYMIC DISORDER: ICD-10-CM

## 2019-11-05 DIAGNOSIS — I10 ESSENTIAL (PRIMARY) HYPERTENSION: ICD-10-CM

## 2019-11-05 PROCEDURE — G0463: CPT

## 2019-11-05 PROCEDURE — 99214 OFFICE O/P EST MOD 30 MIN: CPT | Mod: GC

## 2019-11-26 NOTE — REVIEW OF SYSTEMS
[Negative] : Eyes [Chills] : no chills [Fever] : no fever [Pain] : no pain [Vision Problems] : no vision problems [Chest Pain] : no chest pain [Shortness Of Breath] : no shortness of breath [Palpitations] : no palpitations [Abdominal Pain] : no abdominal pain [Wheezing] : no wheezing [Vomiting] : no vomiting [Dysuria] : no dysuria [Hematuria] : no hematuria [Joint Swelling] : no joint swelling

## 2019-11-26 NOTE — ASSESSMENT
[FreeTextEntry1] : 33F w/ autism, dysthymia on escitalopram, prior alcohol abuse, vitD deficiency presents for f/u for R wrist splint that was placed at  [facility] in October.\par \par # R wrist pain\par - pt no longer has wrist pain, no swelling, full ROM, sensation intact.  Removed spint; no indication for further imaging.\par \par Eli Mcfarland MD\par Internal Medicine, PGY-2\par

## 2019-11-26 NOTE — PHYSICAL EXAM
[Well-Appearing] : well-appearing [Normal Sclera/Conjunctiva] : normal sclera/conjunctiva [EOMI] : extraocular movements intact [No Respiratory Distress] : no respiratory distress  [No Accessory Muscle Use] : no accessory muscle use [Clear to Auscultation] : lungs were clear to auscultation bilaterally [Normal Rate] : normal rate  [Normal S1, S2] : normal S1 and S2 [Regular Rhythm] : with a regular rhythm [No Murmur] : no murmur heard [Soft] : abdomen soft [Non Tender] : non-tender [Non-distended] : non-distended [No Masses] : no abdominal mass palpated [No HSM] : no HSM [Normal Bowel Sounds] : normal bowel sounds [No Joint Swelling] : no joint swelling [Grossly Normal Strength/Tone] : grossly normal strength/tone [de-identified] : sensation intact, no swelling, full ROM

## 2019-11-26 NOTE — END OF VISIT
[] : Resident [FreeTextEntry3] : 32yo F evaluated with Dr. Mcfarland on day of clinic visit; confirm and agree with H&P as noted. Patient with hx autism, dysthymic disorder, seen in f/u after ED visit for wrist trauma – had negative x-ray, splinted. Now all sx resolved, exam wnl. No further intervention - call with problems.

## 2019-11-26 NOTE — HISTORY OF PRESENT ILLNESS
[FreeTextEntry1] : f/u for wrist splint placed at  [facility] in October  [de-identified] : 33F w/ autism, dysthymia on escitalopram, prior alcohol abuse, vitD deficiency presents for f/u for R wrist splint that was placed at  [facility] in October.  Pt had xray that showed no acute radiographic osseous pathology.   \par \par Today pt reports no wrist pain, there is no muscle atrophy, pt has sensation in all fingers and 5/5 motor strength.

## 2020-01-17 ENCOUNTER — APPOINTMENT (OUTPATIENT)
Dept: INTERNAL MEDICINE | Facility: CLINIC | Age: 35
End: 2020-01-17

## 2020-04-02 ENCOUNTER — APPOINTMENT (OUTPATIENT)
Dept: INTERNAL MEDICINE | Facility: CLINIC | Age: 35
End: 2020-04-02

## 2020-07-15 ENCOUNTER — RX RENEWAL (OUTPATIENT)
Age: 35
End: 2020-07-15

## 2020-07-28 ENCOUNTER — OUTPATIENT (OUTPATIENT)
Dept: OUTPATIENT SERVICES | Facility: HOSPITAL | Age: 35
LOS: 1 days | End: 2020-07-28
Payer: MEDICAID

## 2020-07-28 ENCOUNTER — APPOINTMENT (OUTPATIENT)
Dept: INTERNAL MEDICINE | Facility: CLINIC | Age: 35
End: 2020-07-28
Payer: MEDICAID

## 2020-07-28 VITALS — HEART RATE: 72 BPM | OXYGEN SATURATION: 99 %

## 2020-07-28 VITALS
HEIGHT: 67 IN | SYSTOLIC BLOOD PRESSURE: 122 MMHG | BODY MASS INDEX: 32.96 KG/M2 | DIASTOLIC BLOOD PRESSURE: 70 MMHG | WEIGHT: 210 LBS

## 2020-07-28 DIAGNOSIS — B49 UNSPECIFIED MYCOSIS: ICD-10-CM

## 2020-07-28 DIAGNOSIS — F34.1 DYSTHYMIC DISORDER: ICD-10-CM

## 2020-07-28 DIAGNOSIS — S80.02XA CONTUSION OF LEFT KNEE, INITIAL ENCOUNTER: ICD-10-CM

## 2020-07-28 DIAGNOSIS — S62.645D NONDISPLACED FRACTURE OF PROXIMAL PHALANX OF LEFT RING FINGER, SUBSEQUENT ENCOUNTER FOR FRACTURE WITH ROUTINE HEALING: ICD-10-CM

## 2020-07-28 DIAGNOSIS — Z98.89 OTHER SPECIFIED POSTPROCEDURAL STATES: Chronic | ICD-10-CM

## 2020-07-28 DIAGNOSIS — R21 RASH AND OTHER NONSPECIFIC SKIN ERUPTION: ICD-10-CM

## 2020-07-28 DIAGNOSIS — E55.9 VITAMIN D DEFICIENCY, UNSPECIFIED: ICD-10-CM

## 2020-07-28 DIAGNOSIS — I10 ESSENTIAL (PRIMARY) HYPERTENSION: ICD-10-CM

## 2020-07-28 DIAGNOSIS — R05 COUGH: ICD-10-CM

## 2020-07-28 DIAGNOSIS — F10.20 ALCOHOL DEPENDENCE, UNCOMPLICATED: ICD-10-CM

## 2020-07-28 DIAGNOSIS — Z80.0 FAMILY HISTORY OF MALIGNANT NEOPLASM OF DIGESTIVE ORGANS: ICD-10-CM

## 2020-07-28 DIAGNOSIS — Z82.49 FAMILY HISTORY OF ISCHEMIC HEART DISEASE AND OTHER DISEASES OF THE CIRCULATORY SYSTEM: ICD-10-CM

## 2020-07-28 DIAGNOSIS — Z00.00 ENCOUNTER FOR GENERAL ADULT MEDICAL EXAMINATION WITHOUT ABNORMAL FINDINGS: ICD-10-CM

## 2020-07-28 DIAGNOSIS — F84.0 AUTISTIC DISORDER: ICD-10-CM

## 2020-07-28 PROCEDURE — G0463: CPT

## 2020-07-28 PROCEDURE — 99395 PREV VISIT EST AGE 18-39: CPT | Mod: GC

## 2020-07-28 NOTE — PAST MEDICAL HISTORY
[Menstruating] : menstruating [Approximately ___] : the LMP was approximately [unfilled] [Normal Amount/Duration] : it was of a normal amount and duration [Normal Duration] : the duration was normal [Regular Cycle Intervals] : have been regular

## 2020-07-28 NOTE — PLAN
[FreeTextEntry1] : \par #Vitamin D Insufficiency\par - last Vitamin D level was 21.5 in 2019\par - will repeat today \par - patient will continue with Vitamin D supplementation, refills sent to pharmacy \par \par #Alcoholism\par - patient with hx of ETOH abuse, states last drink was NOV 2019\par - patient declines SBIRT intervention, denies any cravings at this time\par \par #Dysthymia\par - follows with psychiatrist every 2-3 months: patient currently denies any depressive sxs\par - c/w with escitalopram, and Lexapro as administered per psychiatrist \par - PHQ= 0 \par \par #Health Care Maintenance\par - CBC, BMP, HgbA1c, Lipid Profile\par - Nutritionist referral will be sent \par - Declines Tdap vaccine during this visit \par - Follows with GYN for PAPs\par - Return to clinic PRN and next year for CPE \par \par \par Discussed with Dr. Silvestre

## 2020-07-28 NOTE — HEALTH RISK ASSESSMENT
[Very Good] : ~his/her~ current health as very good [Good] : ~his/her~  mood as  good [No] : No [1 or 2 (0 pts)] : 1 or 2 (0 points) [No falls in past year] : Patient reported no falls in the past year [Never (0 pts)] : Never (0 points) [0] : 2) Feeling down, depressed, or hopeless: Not at all (0) [Alone] : lives alone [Unemployed] : unemployed [Significant Other] : lives with significant other [Feels Safe at Home] : Feels safe at home [Fully functional (bathing, dressing, toileting, transferring, walking, feeding)] : Fully functional (bathing, dressing, toileting, transferring, walking, feeding) [Fully functional (using the telephone, shopping, preparing meals, housekeeping, doing laundry, using] : Fully functional and needs no help or supervision to perform IADLs (using the telephone, shopping, preparing meals, housekeeping, doing laundry, using transportation, managing medications and managing finances) [] : No [de-identified] : last drink 2019 [de-identified] : walks to [employer], has not been excercising due to COVID  [[id number]] : 0 [Sexually Active] : not sexually active

## 2020-07-28 NOTE — PHYSICAL EXAM
[No Acute Distress] : no acute distress [Well Developed] : well developed [Well-Appearing] : well-appearing [Normal Sclera/Conjunctiva] : normal sclera/conjunctiva [Normal Outer Ear/Nose] : the outer ears and nose were normal in appearance [PERRL] : pupils equal round and reactive to light [No Respiratory Distress] : no respiratory distress  [Clear to Auscultation] : lungs were clear to auscultation bilaterally [No Accessory Muscle Use] : no accessory muscle use [Normal Rate] : normal rate  [Normal] : normal rate, regular rhythm, normal S1 and S2 and no murmur heard [No Joint Swelling] : no joint swelling [No Rash] : no rash [No Focal Deficits] : no focal deficits [Coordination Grossly Intact] : coordination grossly intact [Normal Affect] : the affect was normal [Speech Grossly Normal] : speech grossly normal [Alert and Oriented x3] : oriented to person, place, and time [Normal Insight/Judgement] : insight and judgment were intact

## 2020-07-28 NOTE — COUNSELING
[Quit Drinking] : Quit Drinking [Encouraged to increase physical activity] : Encouraged to increase physical activity [None] : None

## 2020-07-28 NOTE — HISTORY OF PRESENT ILLNESS
[FreeTextEntry1] : CPE [de-identified] : Ms. Carter is a 35 y/o F with pmhx of autism, dysthymia, vit D insufficiency who presents for CPE. Patient states that she has been feeling well. She denies any recent hospitalizations, urgent care visits, or ED visits. Patient denies any fever, chills, SOB, chest pain. Patient has been complaint with her medications. She follows with a psychiatrist every 2-3 months for her dysthymia and autism and has not had any complications or issues. Patient states that she has regular periods that last from 5-8 days. Patient currently lives alone and feel safe in her current environment. Patient is unemployed and received financial assistance via social security.  She has a boyfriend that she has been dating for 13 years but has not been sexually active with.

## 2020-07-30 LAB
25(OH)D3 SERPL-MCNC: 26.8 NG/ML
ANION GAP SERPL CALC-SCNC: 16 MMOL/L
BASOPHILS # BLD AUTO: 0.06 K/UL
BASOPHILS NFR BLD AUTO: 0.6 %
BUN SERPL-MCNC: 9 MG/DL
CALCIUM SERPL-MCNC: 9.6 MG/DL
CHLORIDE SERPL-SCNC: 98 MMOL/L
CHOLEST SERPL-MCNC: 175 MG/DL
CHOLEST/HDLC SERPL: 3.7 RATIO
CO2 SERPL-SCNC: 23 MMOL/L
CREAT SERPL-MCNC: 0.79 MG/DL
EOSINOPHIL # BLD AUTO: 0.07 K/UL
EOSINOPHIL NFR BLD AUTO: 0.7 %
GLUCOSE SERPL-MCNC: 79 MG/DL
HCT VFR BLD CALC: 40.3 %
HDLC SERPL-MCNC: 47 MG/DL
HGB BLD-MCNC: 12.9 G/DL
IMM GRANULOCYTES NFR BLD AUTO: 0.5 %
LDLC SERPL CALC-MCNC: 110 MG/DL
LYMPHOCYTES # BLD AUTO: 2.6 K/UL
LYMPHOCYTES NFR BLD AUTO: 27.3 %
MAN DIFF?: NORMAL
MCHC RBC-ENTMCNC: 30.1 PG
MCHC RBC-ENTMCNC: 32 GM/DL
MCV RBC AUTO: 94.2 FL
MONOCYTES # BLD AUTO: 1.15 K/UL
MONOCYTES NFR BLD AUTO: 12.1 %
NEUTROPHILS # BLD AUTO: 5.59 K/UL
NEUTROPHILS NFR BLD AUTO: 58.8 %
PLATELET # BLD AUTO: 382 K/UL
POTASSIUM SERPL-SCNC: 4.1 MMOL/L
RBC # BLD: 4.28 M/UL
RBC # FLD: 14 %
SODIUM SERPL-SCNC: 137 MMOL/L
TRIGL SERPL-MCNC: 88 MG/DL
WBC # FLD AUTO: 9.52 K/UL

## 2020-08-03 NOTE — ED STATDOCS - CHPI ED RELIEVING FACTORS
nothing Propranolol Pregnancy And Lactation Text: This medication is Pregnancy Category C and it isn't known if it is safe during pregnancy. It is excreted in breast milk.

## 2020-10-19 ENCOUNTER — RX RENEWAL (OUTPATIENT)
Age: 35
End: 2020-10-19

## 2020-12-01 ENCOUNTER — RX RENEWAL (OUTPATIENT)
Age: 35
End: 2020-12-01

## 2021-02-14 ENCOUNTER — EMERGENCY (EMERGENCY)
Facility: HOSPITAL | Age: 36
LOS: 0 days | Discharge: HOME | End: 2021-02-14
Attending: EMERGENCY MEDICINE
Payer: MEDICAID

## 2021-02-14 VITALS — WEIGHT: 190.04 LBS | HEIGHT: 67 IN

## 2021-02-14 VITALS
TEMPERATURE: 98 F | HEART RATE: 63 BPM | RESPIRATION RATE: 18 BRPM | SYSTOLIC BLOOD PRESSURE: 107 MMHG | DIASTOLIC BLOOD PRESSURE: 62 MMHG | OXYGEN SATURATION: 100 %

## 2021-02-14 DIAGNOSIS — F84.0 AUTISTIC DISORDER: ICD-10-CM

## 2021-02-14 DIAGNOSIS — F43.9 REACTION TO SEVERE STRESS, UNSPECIFIED: ICD-10-CM

## 2021-02-14 DIAGNOSIS — R45.850 HOMICIDAL IDEATIONS: ICD-10-CM

## 2021-02-14 DIAGNOSIS — Z79.810 LONG TERM (CURRENT) USE OF SELECTIVE ESTROGEN RECEPTOR MODULATORS (SERMS): ICD-10-CM

## 2021-02-14 DIAGNOSIS — Z79.811 LONG TERM (CURRENT) USE OF AROMATASE INHIBITORS: ICD-10-CM

## 2021-02-14 DIAGNOSIS — F32.9 MAJOR DEPRESSIVE DISORDER, SINGLE EPISODE, UNSPECIFIED: ICD-10-CM

## 2021-02-14 DIAGNOSIS — F41.9 ANXIETY DISORDER, UNSPECIFIED: ICD-10-CM

## 2021-02-14 DIAGNOSIS — R45.851 SUICIDAL IDEATIONS: ICD-10-CM

## 2021-02-14 DIAGNOSIS — Z88.8 ALLERGY STATUS TO OTHER DRUGS, MEDICAMENTS AND BIOLOGICAL SUBSTANCES: ICD-10-CM

## 2021-02-14 DIAGNOSIS — F43.29 ADJUSTMENT DISORDER WITH OTHER SYMPTOMS: ICD-10-CM

## 2021-02-14 DIAGNOSIS — Z98.89 OTHER SPECIFIED POSTPROCEDURAL STATES: Chronic | ICD-10-CM

## 2021-02-14 LAB — PCP SPEC-MCNC: SIGNIFICANT CHANGE UP

## 2021-02-14 PROCEDURE — 90792 PSYCH DIAG EVAL W/MED SRVCS: CPT

## 2021-02-14 PROCEDURE — 99285 EMERGENCY DEPT VISIT HI MDM: CPT | Mod: 25

## 2021-02-14 PROCEDURE — 99285 EMERGENCY DEPT VISIT HI MDM: CPT

## 2021-02-14 PROCEDURE — 80307 DRUG TEST PRSMV CHEM ANLYZR: CPT

## 2021-02-14 PROCEDURE — 93010 ELECTROCARDIOGRAM REPORT: CPT

## 2021-02-14 PROCEDURE — 93005 ELECTROCARDIOGRAM TRACING: CPT

## 2021-02-14 NOTE — ED BEHAVIORAL HEALTH ASSESSMENT NOTE - HPI (INCLUDE ILLNESS QUALITY, SEVERITY, DURATION, TIMING, CONTEXT, MODIFYING FACTORS, ASSOCIATED SIGNS AND SYMPTOMS)
Patient is it 35-year-old  female, with autism, depression, anxiety, prior inpatient admission at [facility], no prior suicide attempt, presents for suicidal and homicidal statements to boyfriend in the setting of interpersonal conflict/argument of a mendoza.    Patient reports to have wanted to have a exclusive Mendoza’s Day with boyfriend however he was sending text messages to different people, of which it angered her, leading to an argument where she threatened property destruction. Denies making suicidal or homicidal threats. Denies depressed mood or hopelessness or suicidal thoughts. Reports mood has been stable with no acute changes in the last several weeks and months.  Denies manic or psychotic symptoms. Patient reports medication compliance with Lexapro 30 mg. Patient reports wanting to go home and spend valentines with boyfriend. Deny safety concerns, is future oriented and engaged safety planning.    nicholas Camacho, [phone], provides collateral stating that girlfriend has been stable with no acute changes in mood or function. Reports however today to have gotten upset over him interacting with different family members due to many of them being sick, I wish this lead to an argument where she threatened harm to him along with self. Reports patient has a history of making the statements in the setting of anger however when she is calm she does not recall. Denies patient is actually suicidal or homicidal, stating that patient has maladaptive responses to emotional stressors at times. Denies safety concerns stating feel comfortable with patient being discharged home to spend valentines with him.

## 2021-02-14 NOTE — ED PROVIDER NOTE - PATIENT PORTAL LINK FT
You can access the FollowMyHealth Patient Portal offered by [facility] by registering at the following website: [email or web address]. By joining [employer]’s FollowMyHealth portal, you will also be able to view your health information using other applications (apps) compatible with our system.

## 2021-02-14 NOTE — ED BEHAVIORAL HEALTH ASSESSMENT NOTE - SUMMARY
Patient is it 35-year-old  female, with autism, depression, anxiety, prior inpatient admission at [facility], no prior suicide attempt, presents for suicidal and homicidal statements to boyfriend in the setting of interpersonal conflict/argument of a ryder.    Patient presenting with adjustment reaction with disturbances in emotions. Patient otherwise has no depression or hopelessness or suicidal thoughts, with no manic / psychotic symptoms. Patient is future oriented, has social supports, is medication compliant and engage with outpatient psychiatric treatment. Patient is engaged and safety planning. Patient is not a high risk for harm to self or others and does not warrant inpatient admission at this time. Patient to say for discharge.

## 2021-02-14 NOTE — ED PROVIDER NOTE - ATTENDING CONTRIBUTION TO CARE
I, Michell Cedeno MD, personally saw the patient with resident.  I have personally performed a face to face diagnostic evaluation on this patient.  I have reviewed the resident note and agree with the history, exam, and plan of care, except as noted.    attending physical exam: jameson schwartz laying in bed in no distress  cvs s1/s2 rrr  lungs cta b/l  abd s/nt/no rebound or guarding  neuro aaox3  psych denies Si or HI

## 2021-02-14 NOTE — ED BEHAVIORAL HEALTH ASSESSMENT NOTE - ADDITIONAL DETAILS ALL
history of making suicidal statement in the setting of reactive passive suicidal ideation due to emotional stressors; no prior active suicidal ideation/intent/plan or suicide attempt

## 2021-02-14 NOTE — ED BEHAVIORAL HEALTH ASSESSMENT NOTE - NSSUICPROTFACT_PSY_ALL_CORE
Responsibility to children, family, or others/Identifies reasons for living/Supportive social network of family or friends/Ability to cope with stress

## 2021-02-14 NOTE — ED PROVIDER NOTE - PROGRESS NOTE DETAILS
pt signed out to dr mayer,, discussed with psych NP telma Aniket: cleared by psych np for discharge.

## 2021-02-14 NOTE — ED BEHAVIORAL HEALTH ASSESSMENT NOTE - DIFFERENTIAL
Major Depressive Disorder v Adjustment disorder with depressed mood   Generalized Anxiety Disorder   Autism

## 2021-02-14 NOTE — ED PROVIDER NOTE - NSFOLLOWUPINSTRUCTIONS_ED_ALL_ED_FT
Your diagnosis: suicidal thoughts    You were seen by our on-call psychiatry NP who cleared you for discharge.    Discharge instructions:    - Please follow up with your psychiatrist in the next few days.    - Be sure to return to the ED if you develop new or worsening symptoms. Specific signs and symptoms to be vigilant of: thoughts of hurting yourself or other people, hallucinations.

## 2021-02-14 NOTE — ED PROVIDER NOTE - NS ED ROS FT
GENERAL: no fever, chills, fatigue, weight loss, night sweats  HEENT: no eye pain, discharge, conjunctivitis, ear pain, hearing loss, rhinorrhea, congestion, throat pain  CARDIAC: no chest pain, palpitations, lightheadedness, syncope  PULM: no dyspnea, wheezing  GI: no abdominal pain, nausea, vomiting, diarrhea, constipation, melena, hematochezia  : no urinary dysuria, frequency, incontinence, hematuria  NEURO: no headache, changes in vision, motor weakness, sensory changes  MSK: no joint pain, joint swelling, myalgias  SKIN: no rashes  HEME: no active bleeding, excessive bruising  PSYCH: + SI/HI
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
## 2021-02-14 NOTE — ED BEHAVIORAL HEALTH ASSESSMENT NOTE - DESCRIPTION
As per HPI     Vital Signs Last 24 Hrs  T(C): 36.7 (14 Feb 2021 13:26), Max: 36.7 (14 Feb 2021 13:26)  T(F): 98 (14 Feb 2021 13:26), Max: 98 (14 Feb 2021 13:26)  HR: 73 (14 Feb 2021 13:26) (73 - 73)  BP: 125/76 (14 Feb 2021 13:26) (125/76 - 125/76)  BP(mean): 87 (14 Feb 2021 13:26) (87 - 87)  RR: 18 (14 Feb 2021 13:26) (18 - 18)  SpO2: 100% (14 Feb 2021 13:26) (100% - 100%) See ED Attending / Provider note As per HPI
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
## 2021-02-14 NOTE — ED BEHAVIORAL HEALTH ASSESSMENT NOTE - RISK ASSESSMENT
LOW RISK     ACUTE RISK FACTORS: reactive depressed mood, made suicidal and homicidal statement.    CHRONIC RISK FACTORS: Autism, MDD, Generalized Anxiety Disorder, history of in-patient hospitalization, history of suicidal statement     PROTECTIVE FACTORS: no prior suicide attempt, medication compliance, future oriented, engaged in safety planning, no suicidal ideation or homicidal ideation, motivation for out-patient psychiatric treatment.     Patient symptoms not indicating imminent risk for harm to self; not warranting involuntary in-patient hospitalization Low Acute Suicide Risk
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
## 2021-02-14 NOTE — ED ADULT TRIAGE NOTE - CHIEF COMPLAINT QUOTE
pt bib [employer] badge 6782 for suspected SI statements reported by the boyfriend.  Upon arrival to Ed the patient denies SI/Hi, is calm and cooperative with no suicide plan.  The patient does endorse wanted to get a psychiatric evaluation to change her medication regimen.

## 2021-02-14 NOTE — ED BEHAVIORAL HEALTH ASSESSMENT NOTE - DETAILS
boyfriend history of making suicidal statement in the setting of reactive passive suicidal ideation due to emotional stressors; no prior active suicidal ideation/intent/plan or suicide attempt Safety plan completed with patient using the “[facility] Safety Plan." The Safety Plan is a best practice recommendation by the Su[facility]. The family was advised to call 911 or take the patient to the nearest ER if patient's behavior worsened or if there are any safety concern.

## 2021-02-17 ENCOUNTER — NON-APPOINTMENT (OUTPATIENT)
Age: 36
End: 2021-02-17

## 2021-02-17 ENCOUNTER — OUTPATIENT (OUTPATIENT)
Dept: OUTPATIENT SERVICES | Facility: HOSPITAL | Age: 36
LOS: 1 days | End: 2021-02-17
Payer: MEDICAID

## 2021-02-17 ENCOUNTER — APPOINTMENT (OUTPATIENT)
Dept: INTERNAL MEDICINE | Facility: CLINIC | Age: 36
End: 2021-02-17

## 2021-02-17 DIAGNOSIS — I10 ESSENTIAL (PRIMARY) HYPERTENSION: ICD-10-CM

## 2021-02-17 DIAGNOSIS — Z98.89 OTHER SPECIFIED POSTPROCEDURAL STATES: Chronic | ICD-10-CM

## 2021-02-17 PROCEDURE — G0463: CPT

## 2021-02-19 DIAGNOSIS — F34.1 DYSTHYMIC DISORDER: ICD-10-CM

## 2021-02-19 DIAGNOSIS — F84.0 AUTISTIC DISORDER: ICD-10-CM

## 2021-04-27 ENCOUNTER — RX RENEWAL (OUTPATIENT)
Age: 36
End: 2021-04-27

## 2021-05-09 ENCOUNTER — EMERGENCY (EMERGENCY)
Facility: HOSPITAL | Age: 36
LOS: 0 days | Discharge: HOME | End: 2021-05-09
Attending: EMERGENCY MEDICINE
Payer: MEDICAID

## 2021-05-09 VITALS
SYSTOLIC BLOOD PRESSURE: 143 MMHG | HEIGHT: 67 IN | TEMPERATURE: 98 F | RESPIRATION RATE: 18 BRPM | DIASTOLIC BLOOD PRESSURE: 77 MMHG | WEIGHT: 229.94 LBS | OXYGEN SATURATION: 99 % | HEART RATE: 91 BPM

## 2021-05-09 DIAGNOSIS — Z98.89 OTHER SPECIFIED POSTPROCEDURAL STATES: Chronic | ICD-10-CM

## 2021-05-09 DIAGNOSIS — F41.9 ANXIETY DISORDER, UNSPECIFIED: ICD-10-CM

## 2021-05-09 DIAGNOSIS — F32.9 MAJOR DEPRESSIVE DISORDER, SINGLE EPISODE, UNSPECIFIED: ICD-10-CM

## 2021-05-09 DIAGNOSIS — R45.851 SUICIDAL IDEATIONS: ICD-10-CM

## 2021-05-09 DIAGNOSIS — Z20.822 CONTACT WITH AND (SUSPECTED) EXPOSURE TO COVID-19: ICD-10-CM

## 2021-05-09 LAB
ALBUMIN SERPL ELPH-MCNC: 4.1 G/DL — SIGNIFICANT CHANGE UP (ref 3.3–5)
ALP SERPL-CCNC: 66 U/L — SIGNIFICANT CHANGE UP (ref 40–120)
ALT FLD-CCNC: 23 U/L — SIGNIFICANT CHANGE UP (ref 12–78)
ANION GAP SERPL CALC-SCNC: 6 MMOL/L — SIGNIFICANT CHANGE UP (ref 5–17)
APAP SERPL-MCNC: < 2 UG/ML (ref 10–30)
APPEARANCE UR: CLEAR — SIGNIFICANT CHANGE UP
AST SERPL-CCNC: 14 U/L — LOW (ref 15–37)
BASOPHILS # BLD AUTO: 0.04 K/UL — SIGNIFICANT CHANGE UP (ref 0–0.2)
BASOPHILS NFR BLD AUTO: 0.3 % — SIGNIFICANT CHANGE UP (ref 0–2)
BILIRUB SERPL-MCNC: 0.2 MG/DL — SIGNIFICANT CHANGE UP (ref 0.2–1.2)
BILIRUB UR-MCNC: NEGATIVE — SIGNIFICANT CHANGE UP
BUN SERPL-MCNC: 17 MG/DL — SIGNIFICANT CHANGE UP (ref 7–23)
CALCIUM SERPL-MCNC: 8.8 MG/DL — SIGNIFICANT CHANGE UP (ref 8.5–10.1)
CHLORIDE SERPL-SCNC: 107 MMOL/L — SIGNIFICANT CHANGE UP (ref 96–108)
CO2 SERPL-SCNC: 24 MMOL/L — SIGNIFICANT CHANGE UP (ref 22–31)
COLOR SPEC: YELLOW — SIGNIFICANT CHANGE UP
CREAT SERPL-MCNC: 0.94 MG/DL — SIGNIFICANT CHANGE UP (ref 0.5–1.3)
DIFF PNL FLD: ABNORMAL
EOSINOPHIL # BLD AUTO: 0.03 K/UL — SIGNIFICANT CHANGE UP (ref 0–0.5)
EOSINOPHIL NFR BLD AUTO: 0.2 % — SIGNIFICANT CHANGE UP (ref 0–6)
ETHANOL SERPL-MCNC: <10 MG/DL — SIGNIFICANT CHANGE UP (ref 0–10)
GLUCOSE SERPL-MCNC: 86 MG/DL — SIGNIFICANT CHANGE UP (ref 70–99)
GLUCOSE UR QL: NEGATIVE MG/DL — SIGNIFICANT CHANGE UP
HCT VFR BLD CALC: 38.2 % — SIGNIFICANT CHANGE UP (ref 34.5–45)
HGB BLD-MCNC: 12.5 G/DL — SIGNIFICANT CHANGE UP (ref 11.5–15.5)
IMM GRANULOCYTES NFR BLD AUTO: 0.7 % — SIGNIFICANT CHANGE UP (ref 0–1.5)
KETONES UR-MCNC: NEGATIVE — SIGNIFICANT CHANGE UP
LEUKOCYTE ESTERASE UR-ACNC: ABNORMAL
LYMPHOCYTES # BLD AUTO: 1.81 K/UL — SIGNIFICANT CHANGE UP (ref 1–3.3)
LYMPHOCYTES # BLD AUTO: 12.8 % — LOW (ref 13–44)
MCHC RBC-ENTMCNC: 30.4 PG — SIGNIFICANT CHANGE UP (ref 27–34)
MCHC RBC-ENTMCNC: 32.7 GM/DL — SIGNIFICANT CHANGE UP (ref 32–36)
MCV RBC AUTO: 92.9 FL — SIGNIFICANT CHANGE UP (ref 80–100)
MONOCYTES # BLD AUTO: 1.1 K/UL — HIGH (ref 0–0.9)
MONOCYTES NFR BLD AUTO: 7.8 % — SIGNIFICANT CHANGE UP (ref 2–14)
NEUTROPHILS # BLD AUTO: 11.07 K/UL — HIGH (ref 1.8–7.4)
NEUTROPHILS NFR BLD AUTO: 78.2 % — HIGH (ref 43–77)
NITRITE UR-MCNC: NEGATIVE — SIGNIFICANT CHANGE UP
PCP SPEC-MCNC: SIGNIFICANT CHANGE UP
PH UR: 5 — SIGNIFICANT CHANGE UP (ref 5–8)
PLATELET # BLD AUTO: 350 K/UL — SIGNIFICANT CHANGE UP (ref 150–400)
POTASSIUM SERPL-MCNC: 3.8 MMOL/L — SIGNIFICANT CHANGE UP (ref 3.5–5.3)
POTASSIUM SERPL-SCNC: 3.8 MMOL/L — SIGNIFICANT CHANGE UP (ref 3.5–5.3)
PROT SERPL-MCNC: 8.3 GM/DL — SIGNIFICANT CHANGE UP (ref 6–8.3)
PROT UR-MCNC: NEGATIVE MG/DL — SIGNIFICANT CHANGE UP
RBC # BLD: 4.11 M/UL — SIGNIFICANT CHANGE UP (ref 3.8–5.2)
RBC # FLD: 13.1 % — SIGNIFICANT CHANGE UP (ref 10.3–14.5)
SALICYLATES SERPL-MCNC: <1.7 MG/DL — LOW (ref 2.8–20)
SARS-COV-2 RNA SPEC QL NAA+PROBE: SIGNIFICANT CHANGE UP
SODIUM SERPL-SCNC: 137 MMOL/L — SIGNIFICANT CHANGE UP (ref 135–145)
SP GR SPEC: 1.02 — SIGNIFICANT CHANGE UP (ref 1.01–1.02)
UROBILINOGEN FLD QL: NEGATIVE MG/DL — SIGNIFICANT CHANGE UP
WBC # BLD: 14.15 K/UL — HIGH (ref 3.8–10.5)
WBC # FLD AUTO: 14.15 K/UL — HIGH (ref 3.8–10.5)

## 2021-05-09 PROCEDURE — 93010 ELECTROCARDIOGRAM REPORT: CPT

## 2021-05-09 PROCEDURE — 81001 URINALYSIS AUTO W/SCOPE: CPT

## 2021-05-09 PROCEDURE — U0003: CPT

## 2021-05-09 PROCEDURE — 85025 COMPLETE CBC W/AUTO DIFF WBC: CPT

## 2021-05-09 PROCEDURE — U0005: CPT

## 2021-05-09 PROCEDURE — 80307 DRUG TEST PRSMV CHEM ANLYZR: CPT

## 2021-05-09 PROCEDURE — 93005 ELECTROCARDIOGRAM TRACING: CPT

## 2021-05-09 PROCEDURE — 99285 EMERGENCY DEPT VISIT HI MDM: CPT

## 2021-05-09 PROCEDURE — 36415 COLL VENOUS BLD VENIPUNCTURE: CPT

## 2021-05-09 PROCEDURE — 80053 COMPREHEN METABOLIC PANEL: CPT
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
## 2021-05-09 NOTE — ED PROVIDER NOTE - CLINICAL SUMMARY MEDICAL DECISION MAKING FREE TEXT BOX
Patient denies SI/HI/Hallucinations.  Pt acting appropriately, no signs of distress/anxiety, not tearful.  Discussed with boyfriend Doug for collateral, states pt has poor coping mechanism, but does not seriously want to hurt herself ever, and has no concerns for her safety.  Okay for d/c home at this time.
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
## 2021-05-09 NOTE — ED PROVIDER NOTE - PATIENT PORTAL LINK FT
You can access the FollowMyHealth Patient Portal offered by [facility] by registering at the following website: [email or web address]. By joining [employer]’s FollowMyHealth portal, you will also be able to view your health information using other applications (apps) compatible with our system.
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
## 2021-05-14 ENCOUNTER — APPOINTMENT (OUTPATIENT)
Dept: INTERNAL MEDICINE | Facility: CLINIC | Age: 36
End: 2021-05-14

## 2021-05-17 ENCOUNTER — EMERGENCY (EMERGENCY)
Facility: HOSPITAL | Age: 36
LOS: 0 days | Discharge: HOME | End: 2021-05-17
Attending: EMERGENCY MEDICINE
Payer: MEDICAID

## 2021-05-17 VITALS
SYSTOLIC BLOOD PRESSURE: 140 MMHG | OXYGEN SATURATION: 97 % | DIASTOLIC BLOOD PRESSURE: 64 MMHG | HEIGHT: 67 IN | WEIGHT: 210.1 LBS | TEMPERATURE: 98 F | HEART RATE: 115 BPM | RESPIRATION RATE: 17 BRPM

## 2021-05-17 VITALS
HEART RATE: 104 BPM | OXYGEN SATURATION: 95 % | SYSTOLIC BLOOD PRESSURE: 131 MMHG | TEMPERATURE: 98 F | DIASTOLIC BLOOD PRESSURE: 86 MMHG | RESPIRATION RATE: 18 BRPM

## 2021-05-17 DIAGNOSIS — Z98.89 OTHER SPECIFIED POSTPROCEDURAL STATES: Chronic | ICD-10-CM

## 2021-05-17 DIAGNOSIS — R45.851 SUICIDAL IDEATIONS: ICD-10-CM

## 2021-05-17 DIAGNOSIS — F43.24 ADJUSTMENT DISORDER WITH DISTURBANCE OF CONDUCT: ICD-10-CM

## 2021-05-17 DIAGNOSIS — R45.850 HOMICIDAL IDEATIONS: ICD-10-CM

## 2021-05-17 DIAGNOSIS — R07.9 CHEST PAIN, UNSPECIFIED: ICD-10-CM

## 2021-05-17 DIAGNOSIS — Z88.8 ALLERGY STATUS TO OTHER DRUGS, MEDICAMENTS AND BIOLOGICAL SUBSTANCES STATUS: ICD-10-CM

## 2021-05-17 DIAGNOSIS — F41.9 ANXIETY DISORDER, UNSPECIFIED: ICD-10-CM

## 2021-05-17 DIAGNOSIS — F32.9 MAJOR DEPRESSIVE DISORDER, SINGLE EPISODE, UNSPECIFIED: ICD-10-CM

## 2021-05-17 DIAGNOSIS — F84.0 AUTISTIC DISORDER: ICD-10-CM

## 2021-05-17 LAB
ANION GAP SERPL CALC-SCNC: 3 MMOL/L — LOW (ref 5–17)
APAP SERPL-MCNC: < 2 UG/ML (ref 10–30)
BASOPHILS # BLD AUTO: 0.03 K/UL — SIGNIFICANT CHANGE UP (ref 0–0.2)
BASOPHILS NFR BLD AUTO: 0.2 % — SIGNIFICANT CHANGE UP (ref 0–2)
BUN SERPL-MCNC: 13 MG/DL — SIGNIFICANT CHANGE UP (ref 7–23)
CALCIUM SERPL-MCNC: 8.8 MG/DL — SIGNIFICANT CHANGE UP (ref 8.5–10.1)
CHLORIDE SERPL-SCNC: 109 MMOL/L — HIGH (ref 96–108)
CO2 SERPL-SCNC: 28 MMOL/L — SIGNIFICANT CHANGE UP (ref 22–31)
CREAT SERPL-MCNC: 0.9 MG/DL — SIGNIFICANT CHANGE UP (ref 0.5–1.3)
EOSINOPHIL # BLD AUTO: 0.06 K/UL — SIGNIFICANT CHANGE UP (ref 0–0.5)
EOSINOPHIL NFR BLD AUTO: 0.5 % — SIGNIFICANT CHANGE UP (ref 0–6)
ETHANOL SERPL-MCNC: <10 MG/DL — SIGNIFICANT CHANGE UP (ref 0–10)
GLUCOSE SERPL-MCNC: 110 MG/DL — HIGH (ref 70–99)
HCT VFR BLD CALC: 36.5 % — SIGNIFICANT CHANGE UP (ref 34.5–45)
HGB BLD-MCNC: 11.8 G/DL — SIGNIFICANT CHANGE UP (ref 11.5–15.5)
IMM GRANULOCYTES NFR BLD AUTO: 0.6 % — SIGNIFICANT CHANGE UP (ref 0–1.5)
LYMPHOCYTES # BLD AUTO: 1.89 K/UL — SIGNIFICANT CHANGE UP (ref 1–3.3)
LYMPHOCYTES # BLD AUTO: 14.3 % — SIGNIFICANT CHANGE UP (ref 13–44)
MCHC RBC-ENTMCNC: 30 PG — SIGNIFICANT CHANGE UP (ref 27–34)
MCHC RBC-ENTMCNC: 32.3 GM/DL — SIGNIFICANT CHANGE UP (ref 32–36)
MCV RBC AUTO: 92.9 FL — SIGNIFICANT CHANGE UP (ref 80–100)
MONOCYTES # BLD AUTO: 1.29 K/UL — HIGH (ref 0–0.9)
MONOCYTES NFR BLD AUTO: 9.8 % — SIGNIFICANT CHANGE UP (ref 2–14)
NEUTROPHILS # BLD AUTO: 9.83 K/UL — HIGH (ref 1.8–7.4)
NEUTROPHILS NFR BLD AUTO: 74.6 % — SIGNIFICANT CHANGE UP (ref 43–77)
PCP SPEC-MCNC: SIGNIFICANT CHANGE UP
PLATELET # BLD AUTO: 371 K/UL — SIGNIFICANT CHANGE UP (ref 150–400)
POTASSIUM SERPL-MCNC: 3.8 MMOL/L — SIGNIFICANT CHANGE UP (ref 3.5–5.3)
POTASSIUM SERPL-SCNC: 3.8 MMOL/L — SIGNIFICANT CHANGE UP (ref 3.5–5.3)
RBC # BLD: 3.93 M/UL — SIGNIFICANT CHANGE UP (ref 3.8–5.2)
RBC # FLD: 13.2 % — SIGNIFICANT CHANGE UP (ref 10.3–14.5)
SALICYLATES SERPL-MCNC: <1.7 MG/DL — LOW (ref 2.8–20)
SODIUM SERPL-SCNC: 140 MMOL/L — SIGNIFICANT CHANGE UP (ref 135–145)
WBC # BLD: 13.18 K/UL — HIGH (ref 3.8–10.5)
WBC # FLD AUTO: 13.18 K/UL — HIGH (ref 3.8–10.5)

## 2021-05-17 PROCEDURE — 93005 ELECTROCARDIOGRAM TRACING: CPT

## 2021-05-17 PROCEDURE — 80307 DRUG TEST PRSMV CHEM ANLYZR: CPT

## 2021-05-17 PROCEDURE — 85025 COMPLETE CBC W/AUTO DIFF WBC: CPT

## 2021-05-17 PROCEDURE — 93010 ELECTROCARDIOGRAM REPORT: CPT

## 2021-05-17 PROCEDURE — 99285 EMERGENCY DEPT VISIT HI MDM: CPT

## 2021-05-17 PROCEDURE — 36415 COLL VENOUS BLD VENIPUNCTURE: CPT

## 2021-05-17 PROCEDURE — 90792 PSYCH DIAG EVAL W/MED SRVCS: CPT | Mod: 95

## 2021-05-17 PROCEDURE — 80048 BASIC METABOLIC PNL TOTAL CA: CPT

## 2021-05-17 NOTE — ED BEHAVIORAL HEALTH ASSESSMENT NOTE - RISK ASSESSMENT
af argument with bf   cf cluster b traits   pf supportive housing, good social supports, sobriety, physical health, no hx of self harm/SA Low Acute Suicide Risk

## 2021-05-17 NOTE — ED PROVIDER NOTE - NSFOLLOWUPINSTRUCTIONS_ED_ALL_ED_FT
Depression    WHAT YOU NEED TO KNOW:    Depression is a medical condition that causes feelings of sadness or hopelessness that do not go away. Depression may cause you to lose interest in things you used to enjoy. These feelings may interfere with your daily life.    DISCHARGE INSTRUCTIONS:    Call your local emergency number (911 in the ) if:     You think about harming yourself or someone else.      You have done something on purpose to hurt yourself.    Call your therapist or doctor if:     Your symptoms do not improve.      You cannot make it to your next appointment.       You have new symptoms.      You have questions or concerns about your condition or care.    The following resources are available at any time to help you, if needed:     National Suicide Prevention Lifeline: 1-790.380.6259 (7-987-768-TALK)      Suicide Hotline: 1-610.763.3135 (2-307-GMRCJHK)      For a list of international numbers: https://[employer].org/find-help/international-resources/    Medicines:     Antidepressants may be given to improve or balance your mood. You may need to take this medicine for several weeks before you begin to feel better.      Take your medicine as directed. Contact your healthcare provider if you think your medicine is not helping or if you have side effects. Tell him of her if you are allergic to any medicine. Keep a list of the medicines, vitamins, and herbs you take. Include the amounts, and when and why you take them. Bring the list or the pill bottles to follow-up visits. Carry your medicine list with you in case of an emergency.    Therapy is often used together with medicine to relieve depression. Therapy is a way for you to talk about your feelings and anything that may be causing depression. Therapy can be done alone or in a group. It may also be done with family members or a significant other.    Self-care:     Get regular physical activity. Try to be active for 30 minutes, 3 to 5 days a week. Physical activity can help relieve depression. Work with your healthcare provider to develop a plan that you enjoy. It may help to ask someone to be active with you.      Create a regular sleep schedule. A routine can help you relax before bed. Listen to music, read, or do yoga. Try to go to bed and wake up at the same time every day. Sleep is important for emotional health.      Eat a variety of healthy foods. Healthy foods include fruits, vegetables, whole-grain breads, low-fat dairy products, lean meats, fish, and cooked beans. A healthy meal plan is low in fat, salt, and added sugar.      Do not drink alcohol or use drugs. Alcohol and drugs can make depression worse. Talk to your therapist or doctor if you need help quitting.    Follow up with your healthcare provider as directed: Your healthcare provider will monitor your progress at follow-up visits. He or she will also monitor your medicine if you take antidepressants. Your healthcare provider will ask if the medicine is helping. Tell him or her about any side effects or problems you may have with your medicine. The type or amount of medicine may need to be changed. Write down your questions so you remember to ask them during your visits.

## 2021-05-17 NOTE — ED PROVIDER NOTE - NS_ ATTENDINGSCRIBEDETAILS _ED_A_ED_FT
I, Sumeet Fernández MD,  performed the initial face to face bedside interview with this patient regarding history of present illness, review of symptoms and relevant past medical, social and family history.  I completed an independent physical examination.    The history, relevant review of systems, past medical and surgical history, medical decision making, and physical examination was documented by the scribe in my presence and I attest to the accuracy of the documentation.
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
## 2021-05-17 NOTE — ED ADULT NURSE REASSESSMENT NOTE - NS ED NURSE REASSESS COMMENT FT1
Assumed care of pt. Pt states she got into an argument earlier today, denies SI/Hi at this time to RN however tearful while talking about living situation. Pt offered food but declined. Pt resting at this time with 1:1 at bedside and all safety measures in place.

## 2021-05-17 NOTE — ED PROVIDER NOTE - PATIENT PORTAL LINK FT
You can access the FollowMyHealth Patient Portal offered by [facility] by registering at the following website: [email or web address]. By joining [employer]’s FollowMyHealth portal, you will also be able to view your health information using other applications (apps) compatible with our system.

## 2021-05-17 NOTE — ED BEHAVIORAL HEALTH ASSESSMENT NOTE - SUMMARY
Pt is a 34yo F, in relationship, domiciled at residence, reported psychiatric history of autism, depression, anxiety, treated as outpt by Dr Dolan with lexapro 30mg, reports adherence, reports past admissions, last 'years ago' at [facility], no known hx self harm/SA, reports hasn't had alcohol 'in years', denies ever using cigarettes or drugs, med hx of wrist surgery s/p fracture, no known trauma/legal hx, consult called to evaluate SI.  Pt presents expressing regret about the argument with her boyfriend and hope that they will be able to forgive each other, and reporting stressor of a friend's illness. She reports she has supportive friends and staff at her residence and no intention or plan to harm herself. No collateral was available but her presentation is overall low risk and she is psychiatrically cleared to leave the ED.

## 2021-05-17 NOTE — ED PROVIDER NOTE - PROGRESS NOTE DETAILS
Jolene MILNER: Pt from resident at [facility] per PD note. Pt states her boyfriend was at residence and then was "kicked out". Per PD patient was suicidal. Per triage note patient was at group home unsure if group home has name. Not listed in Pd report. Jolene MILNER: spoke with telepsychiatry who saw and evaluated patient and states patient can be d/c back to facility. will d/c.

## 2021-05-17 NOTE — ED ADULT NURSE REASSESSMENT NOTE - NS ED NURSE REASSESS COMMENT FT1
Pt cleared by psych for d/c. as per doctor yamel pt is safe and appropriate for discharge. Pt states she feels ok and safe to go home. Pt called  for ride home

## 2021-05-17 NOTE — ED BEHAVIORAL HEALTH NOTE - BEHAVIORAL HEALTH NOTE
===================  PRE-HOSPITAL COURSE  ===================  SOURCE: Chart review    DETAILS:  Pt bought in by [facility] from her group home    ============  ED COURSE   ============  SOURCE: Chart review    ARRIVAL:  PD  BELONGINGS: Nothing of note    BEHAVIOR: Pt was compliant with good cooperation for entire process of wanding/search/ and gowning and was escorted to the  area with no issues. Nothing of note in pt’s belongings. Hygiene is good, RN reports patient provided both urine specimen and routine blood work willingly. Pt reports she is very upset about the state of her relationship with her boyfriend. The staff in the group home called the police because her boyfriend could not stay in her group home. Pt. denies SI/HI and denies delusions. Pt’s affect is depressed, speech is good, thoughts are linear and logical, and eye contact is good, no aggression or behavioral issues and is AOX4.   TREATMENT: None required    VISITORS:  No one at bedside     COVID Exposure Screen- collateral (i.e. third-party, chart review, belongings, etc; include EMS and ED staff)  1.	*Has the patient had a COVID-19 test in the last 90 days?  (  X) Yes   (  ) No   (  ) Unknown- Reason: _____  IF YES PROCEED TO QUESTION #2. IF NO OR UNKNOWN, PLEASE SKIP TO QUESTION #3.  2.	Date of test(s) and result(s): _05/09/21 negative_______  3.	*Has the patient tested positive for COVID-19 antibodies? (  ) Yes   (  ) No   (X  ) Unknown- Reason: _____  IF YES PROCEED TO QUESTION #4. IF NO or UNKNOWN, PLEASE SKIP TO QUESTION #5.  4.	Date of positive antibody test: ________  5.	*Has the patient received 2 doses of the COVID-19 vaccine? (  ) Yes   (  ) No   ( X ) Unknown- Reason: _____  IF YES PROCEED TO QUESTION #6. IF NO or UNKNOWN, PLEASE SKIP TO QUESTION #7.  6.	 Date of second dose: ________  7.	*In the past 10 days, has the patient been around anyone with a positive COVID-19 test?* (  ) Yes   (  ) No   ( X ) Unknown- Reason: __  IF YES PROCEED TO QUESTION #8. IF NO or UNKNOWN, PLEASE SKIP TO QUESTION #13.  8.	Was the patient within 6 feet of them for at least 15 minutes? (  ) Yes   (  ) No   (  ) Unknown- Reason: _____  9.	Did the patient provide care for them? (  ) Yes   (  ) No   (  ) Unknown- Reason: ______  10.	Did the patient have direct physical contact with them (touched, hugged, or kissed them)? (  ) Yes   (  ) No    (  ) Unknown- Reason: __  11.	Did the patient share eating or drinking utensils with them? (  ) Yes   (  ) No    (  ) Unknown- Reason: ____  12.	Did they sneeze, cough, or somehow get respiratory droplets on the patient? (  ) Yes   (  ) No    (  ) Unknown- Reason: ______  13.	*Has the patient been out of [place] within the past 10 days?* (  ) Yes   (  ) No   ( X ) Unknown- Reason: _____  IF YES PLEASE ANSWER THE FOLLOWING QUESTIONS:  14.	Which state/country did they go to? ______  15.	Were they there over 24 hours? (  ) Yes   (  ) No    (  ) Unknown- Reason: ______  16.	Date of return to [facility]: ______

## 2021-05-18 ENCOUNTER — NON-APPOINTMENT (OUTPATIENT)
Age: 36
End: 2021-05-18

## 2021-09-26 ENCOUNTER — TRANSCRIPTION ENCOUNTER (OUTPATIENT)
Age: 36
End: 2021-09-26

## 2021-10-06 ENCOUNTER — EMERGENCY (EMERGENCY)
Facility: HOSPITAL | Age: 36
LOS: 0 days | Discharge: HOME | End: 2021-10-06
Attending: EMERGENCY MEDICINE
Payer: MEDICAID

## 2021-10-06 ENCOUNTER — NON-APPOINTMENT (OUTPATIENT)
Age: 36
End: 2021-10-06

## 2021-10-06 VITALS
SYSTOLIC BLOOD PRESSURE: 130 MMHG | TEMPERATURE: 98 F | DIASTOLIC BLOOD PRESSURE: 94 MMHG | OXYGEN SATURATION: 98 % | HEART RATE: 91 BPM | RESPIRATION RATE: 18 BRPM

## 2021-10-06 VITALS — WEIGHT: 220.02 LBS | HEIGHT: 67 IN

## 2021-10-06 DIAGNOSIS — K62.5 HEMORRHAGE OF ANUS AND RECTUM: ICD-10-CM

## 2021-10-06 DIAGNOSIS — F41.9 ANXIETY DISORDER, UNSPECIFIED: ICD-10-CM

## 2021-10-06 DIAGNOSIS — F84.0 AUTISTIC DISORDER: ICD-10-CM

## 2021-10-06 DIAGNOSIS — Z88.8 ALLERGY STATUS TO OTHER DRUGS, MEDICAMENTS AND BIOLOGICAL SUBSTANCES STATUS: ICD-10-CM

## 2021-10-06 DIAGNOSIS — K59.00 CONSTIPATION, UNSPECIFIED: ICD-10-CM

## 2021-10-06 DIAGNOSIS — Z98.89 OTHER SPECIFIED POSTPROCEDURAL STATES: Chronic | ICD-10-CM

## 2021-10-06 DIAGNOSIS — F32.9 MAJOR DEPRESSIVE DISORDER, SINGLE EPISODE, UNSPECIFIED: ICD-10-CM

## 2021-10-06 DIAGNOSIS — I10 ESSENTIAL (PRIMARY) HYPERTENSION: ICD-10-CM

## 2021-10-06 PROBLEM — F10.20 ALCOHOL DEPENDENCE, UNCOMPLICATED: Chronic | Status: ACTIVE | Noted: 2021-05-17

## 2021-10-06 LAB
ALBUMIN SERPL ELPH-MCNC: 4 G/DL — SIGNIFICANT CHANGE UP (ref 3.3–5)
ALP SERPL-CCNC: 74 U/L — SIGNIFICANT CHANGE UP (ref 40–120)
ALT FLD-CCNC: 24 U/L — SIGNIFICANT CHANGE UP (ref 12–78)
ANION GAP SERPL CALC-SCNC: 7 MMOL/L — SIGNIFICANT CHANGE UP (ref 5–17)
APTT BLD: 33.7 SEC — SIGNIFICANT CHANGE UP (ref 27.5–35.5)
AST SERPL-CCNC: 11 U/L — LOW (ref 15–37)
BASOPHILS # BLD AUTO: 0.04 K/UL — SIGNIFICANT CHANGE UP (ref 0–0.2)
BASOPHILS NFR BLD AUTO: 0.3 % — SIGNIFICANT CHANGE UP (ref 0–2)
BILIRUB SERPL-MCNC: 0.2 MG/DL — SIGNIFICANT CHANGE UP (ref 0.2–1.2)
BUN SERPL-MCNC: 12 MG/DL — SIGNIFICANT CHANGE UP (ref 7–23)
CALCIUM SERPL-MCNC: 9.3 MG/DL — SIGNIFICANT CHANGE UP (ref 8.5–10.1)
CHLORIDE SERPL-SCNC: 104 MMOL/L — SIGNIFICANT CHANGE UP (ref 96–108)
CO2 SERPL-SCNC: 27 MMOL/L — SIGNIFICANT CHANGE UP (ref 22–31)
CREAT SERPL-MCNC: 0.93 MG/DL — SIGNIFICANT CHANGE UP (ref 0.5–1.3)
EOSINOPHIL # BLD AUTO: 0.1 K/UL — SIGNIFICANT CHANGE UP (ref 0–0.5)
EOSINOPHIL NFR BLD AUTO: 0.8 % — SIGNIFICANT CHANGE UP (ref 0–6)
GLUCOSE SERPL-MCNC: 113 MG/DL — HIGH (ref 70–99)
HCT VFR BLD CALC: 39.1 % — SIGNIFICANT CHANGE UP (ref 34.5–45)
HGB BLD-MCNC: 12.2 G/DL — SIGNIFICANT CHANGE UP (ref 11.5–15.5)
IMM GRANULOCYTES NFR BLD AUTO: 0.6 % — SIGNIFICANT CHANGE UP (ref 0–1.5)
INR BLD: 1.07 RATIO — SIGNIFICANT CHANGE UP (ref 0.88–1.16)
LYMPHOCYTES # BLD AUTO: 24.8 % — SIGNIFICANT CHANGE UP (ref 13–44)
LYMPHOCYTES # BLD AUTO: 3.04 K/UL — SIGNIFICANT CHANGE UP (ref 1–3.3)
MCHC RBC-ENTMCNC: 28.2 PG — SIGNIFICANT CHANGE UP (ref 27–34)
MCHC RBC-ENTMCNC: 31.2 GM/DL — LOW (ref 32–36)
MCV RBC AUTO: 90.5 FL — SIGNIFICANT CHANGE UP (ref 80–100)
MONOCYTES # BLD AUTO: 1.16 K/UL — HIGH (ref 0–0.9)
MONOCYTES NFR BLD AUTO: 9.5 % — SIGNIFICANT CHANGE UP (ref 2–14)
NEUTROPHILS # BLD AUTO: 7.83 K/UL — HIGH (ref 1.8–7.4)
NEUTROPHILS NFR BLD AUTO: 64 % — SIGNIFICANT CHANGE UP (ref 43–77)
PLATELET # BLD AUTO: 388 K/UL — SIGNIFICANT CHANGE UP (ref 150–400)
POTASSIUM SERPL-MCNC: 3.9 MMOL/L — SIGNIFICANT CHANGE UP (ref 3.5–5.3)
POTASSIUM SERPL-SCNC: 3.9 MMOL/L — SIGNIFICANT CHANGE UP (ref 3.5–5.3)
PROT SERPL-MCNC: 8.3 GM/DL — SIGNIFICANT CHANGE UP (ref 6–8.3)
PROTHROM AB SERPL-ACNC: 12.5 SEC — SIGNIFICANT CHANGE UP (ref 10.6–13.6)
RBC # BLD: 4.32 M/UL — SIGNIFICANT CHANGE UP (ref 3.8–5.2)
RBC # FLD: 13.8 % — SIGNIFICANT CHANGE UP (ref 10.3–14.5)
SODIUM SERPL-SCNC: 138 MMOL/L — SIGNIFICANT CHANGE UP (ref 135–145)
WBC # BLD: 12.24 K/UL — HIGH (ref 3.8–10.5)
WBC # FLD AUTO: 12.24 K/UL — HIGH (ref 3.8–10.5)

## 2021-10-06 PROCEDURE — 99283 EMERGENCY DEPT VISIT LOW MDM: CPT

## 2021-10-06 PROCEDURE — 80053 COMPREHEN METABOLIC PANEL: CPT

## 2021-10-06 PROCEDURE — 85025 COMPLETE CBC W/AUTO DIFF WBC: CPT

## 2021-10-06 PROCEDURE — 36415 COLL VENOUS BLD VENIPUNCTURE: CPT

## 2021-10-06 PROCEDURE — 99284 EMERGENCY DEPT VISIT MOD MDM: CPT

## 2021-10-06 PROCEDURE — 85730 THROMBOPLASTIN TIME PARTIAL: CPT

## 2021-10-06 PROCEDURE — 85610 PROTHROMBIN TIME: CPT

## 2021-10-06 NOTE — ED PROVIDER NOTE - NSICDXPASTMEDICALHX_GEN_ALL_CORE_FT
PAST MEDICAL HISTORY:  Alcoholism     Anxiety     Autistic disorder     Depression     HTN (hypertension)

## 2021-10-06 NOTE — ED PROVIDER NOTE - PROGRESS NOTE DETAILS
pt remains well. hgb, CMP, coags wnl. WBC 12, however last 2 vists to ED for psych eval, also had elev CBC at 14 and 13 (May 2021). Abd remains benign. D/C f/u GI. Discussed with pt constipation prevention. MD BARBARA

## 2021-10-06 NOTE — ED PROVIDER NOTE - CARE PROVIDER_API CALL
Eric Mcmahan)  Gastroenterology; Internal Medicine  [address] [place] [address]  Phone: [phone]  Fax: [phone]  Follow Up Time:

## 2021-10-06 NOTE — ED PROVIDER NOTE - CLINICAL SUMMARY MEDICAL DECISION MAKING FREE TEXT BOX
pt with BRB for 4 days with BM and with constipation. Hx of alcohol abuse, no etOH for over 1 year. Abd benign. Rectal no blood, no hemorrhoid. Will ck labs reEval.

## 2021-10-06 NOTE — ED PROVIDER NOTE - OBJECTIVE STATEMENT
34 yo F with autism, anx/depression, no EtoH for 1 year, with c/o 4 days of BRBPR with BM. Had BM today, no blood, but was still worried. No abd pain. No n/v. does not take any OAC. denies abd surg hx.  Sometimes strains and has pain with BM. Never noticed blood before. No hx of colonoscopy. Does not feel lightheaded, faint, or dizzy. +covid vaccinated

## 2021-10-06 NOTE — ED ADULT NURSE NOTE - OBJECTIVE STATEMENT
Pt. presents to ED c/o rectal bleeding for the last 4 days. Pt. states she notices bleeding when she has a BM. Denies N/V/D/dark stools. Denies abdominal pain

## 2021-10-06 NOTE — ED PROVIDER NOTE - NSFOLLOWUPINSTRUCTIONS_ED_ALL_ED_FT
Try senekot pill as directed on the bottle to soften your stool. May also increase fiber in your diet with fruits, vegetables. Drink plenty of water daily, 6-8, 6ounce glasses per day.     Followup with GI doctor. Call for appointment.     Return to Emergency Department for worsening, any concerns.

## 2021-10-06 NOTE — ED PROVIDER NOTE - PATIENT PORTAL LINK FT
You can access the FollowMyHealth Patient Portal offered by [facility] by registering at the following website: [email or web address]. By joining [employer]’s FollowMyHealth portal, you will also be able to view your health information using other applications (apps) compatible with our system.

## 2021-10-06 NOTE — ED PROVIDER NOTE - GASTROINTESTINAL, MLM
Abdomen soft, non-tender, no guarding. RECTAL: no fissure, no hemorrhoid, NST, brown stool , Guaic negative, No BRB

## 2021-10-06 NOTE — ED ADULT NURSE NOTE - CHIEF COMPLAINT QUOTE
Pt. presents to ED c/o rectal bleeding for the last 4 days. Pt. states she notices bleeding when she has a BM. Denies N/V/D/dark stools

## 2021-10-06 NOTE — ED ADULT TRIAGE NOTE - CHIEF COMPLAINT QUOTE
Addended by: Rolando Headings on: 4/5/2017 08:54 AM     Modules accepted: Mahamed Pt. presents to ED c/o rectal bleeding for the last 4 days. Pt. states she notices bleeding when she has a BM. Denies N/V/D/dark stools

## 2021-10-28 ENCOUNTER — OUTPATIENT (OUTPATIENT)
Dept: OUTPATIENT SERVICES | Facility: HOSPITAL | Age: 36
LOS: 1 days | End: 2021-10-28
Payer: MEDICAID

## 2021-10-28 ENCOUNTER — APPOINTMENT (OUTPATIENT)
Dept: INTERNAL MEDICINE | Facility: CLINIC | Age: 36
End: 2021-10-28
Payer: MEDICAID

## 2021-10-28 ENCOUNTER — NON-APPOINTMENT (OUTPATIENT)
Age: 36
End: 2021-10-28

## 2021-10-28 VITALS
BODY MASS INDEX: 38.45 KG/M2 | WEIGHT: 245 LBS | SYSTOLIC BLOOD PRESSURE: 118 MMHG | OXYGEN SATURATION: 98 % | DIASTOLIC BLOOD PRESSURE: 78 MMHG | HEART RATE: 105 BPM | HEIGHT: 67 IN

## 2021-10-28 DIAGNOSIS — Z98.89 OTHER SPECIFIED POSTPROCEDURAL STATES: Chronic | ICD-10-CM

## 2021-10-28 DIAGNOSIS — I10 ESSENTIAL (PRIMARY) HYPERTENSION: ICD-10-CM

## 2021-10-28 DIAGNOSIS — K62.5 HEMORRHAGE OF ANUS AND RECTUM: ICD-10-CM

## 2021-10-28 DIAGNOSIS — E55.9 VITAMIN D DEFICIENCY, UNSPECIFIED: ICD-10-CM

## 2021-10-28 PROCEDURE — G0463: CPT

## 2021-10-28 PROCEDURE — 99213 OFFICE O/P EST LOW 20 MIN: CPT | Mod: GE

## 2021-10-28 NOTE — ASSESSMENT
[FreeTextEntry1] : 36 Y/O F w PMH of depression, and recent ED visit for BRBPR presenting for a F/U. Patient VSS, labs WNL, pt not symptomatic and having no more episodes of rectal bleeding since ED visit. No need for lab work today, will give GI referral. \par \par #Rectal bleeding\par - Patient increased fiber intake, using over the counter laxatives, and asymptomatic since ED discharge on 10/6 \par - Pt no longer having rectal bleeding since ED discharge\par - Will give GI referral since pt never f/u due to insurance problems \par - No plan for lab-work today\par - Encouraged continued fiber intake\par \par RTC at next annual exam\par \par D/w Dr. Wolf

## 2021-10-28 NOTE — PHYSICAL EXAM
[No Acute Distress] : no acute distress [Well-Appearing] : well-appearing [Normal Sclera/Conjunctiva] : normal sclera/conjunctiva [No JVD] : no jugular venous distention [No Respiratory Distress] : no respiratory distress  [No Accessory Muscle Use] : no accessory muscle use [Clear to Auscultation] : lungs were clear to auscultation bilaterally [Normal Rate] : normal rate  [Regular Rhythm] : with a regular rhythm [Normal S1, S2] : normal S1 and S2 [No Murmur] : no murmur heard [Soft] : abdomen soft [Non Tender] : non-tender [Non-distended] : non-distended [Speech Grossly Normal] : speech grossly normal

## 2021-10-28 NOTE — HISTORY OF PRESENT ILLNESS
[Spouse] : spouse [FreeTextEntry1] : 36 y/o F PMH of depression, and rectal bleeding presenting for A follow-up appointment [de-identified] : 34 y/o F w/ PMH of depression, and recent ED visit for BRBPR on 10/6 presenting for a follow-up. Patient was seen in the ED on 10/6 for 4 days of BRBPR, at that time she HDS and labd were WNL. Patient was discharged the same day with instructions to increase fiber intake and to f/u with GI; However pt was unable to f/u with GI because her insurance did not cover the visit. Since her discharge from the ED she has not had anymore episodes of BRBPR. She says she has normal BM a day (1-2) and denies diarrhea or worsening constipation. She has also been using OTC laxatives when needed and it has helped her constipation. Patient denies any significant ROS (Fevers, chills, Headaches, vision changes, CP, syncope, palpitations, SOB, sputum production, ABD pain, N/V)

## 2021-11-01 DIAGNOSIS — E55.9 VITAMIN D DEFICIENCY, UNSPECIFIED: ICD-10-CM

## 2021-11-01 DIAGNOSIS — K62.5 HEMORRHAGE OF ANUS AND RECTUM: ICD-10-CM

## 2021-11-01 DIAGNOSIS — F34.1 DYSTHYMIC DISORDER: ICD-10-CM

## 2021-11-30 ENCOUNTER — NON-APPOINTMENT (OUTPATIENT)
Age: 36
End: 2021-11-30

## 2021-12-01 ENCOUNTER — OUTPATIENT (OUTPATIENT)
Dept: OUTPATIENT SERVICES | Facility: HOSPITAL | Age: 36
LOS: 1 days | End: 2021-12-01
Payer: MEDICAID

## 2021-12-01 ENCOUNTER — NON-APPOINTMENT (OUTPATIENT)
Age: 36
End: 2021-12-01

## 2021-12-01 ENCOUNTER — APPOINTMENT (OUTPATIENT)
Dept: INTERNAL MEDICINE | Facility: CLINIC | Age: 36
End: 2021-12-01
Payer: MEDICAID

## 2021-12-01 VITALS
SYSTOLIC BLOOD PRESSURE: 118 MMHG | BODY MASS INDEX: 38.61 KG/M2 | HEIGHT: 67 IN | HEART RATE: 100 BPM | DIASTOLIC BLOOD PRESSURE: 78 MMHG | OXYGEN SATURATION: 98 % | WEIGHT: 246 LBS

## 2021-12-01 DIAGNOSIS — L91.8 OTHER HYPERTROPHIC DISORDERS OF THE SKIN: ICD-10-CM

## 2021-12-01 DIAGNOSIS — I10 ESSENTIAL (PRIMARY) HYPERTENSION: ICD-10-CM

## 2021-12-01 DIAGNOSIS — Z13.1 ENCOUNTER FOR SCREENING FOR DIABETES MELLITUS: ICD-10-CM

## 2021-12-01 DIAGNOSIS — Z13.220 ENCOUNTER FOR SCREENING FOR LIPOID DISORDERS: ICD-10-CM

## 2021-12-01 DIAGNOSIS — F34.1 DYSTHYMIC DISORDER: ICD-10-CM

## 2021-12-01 DIAGNOSIS — Z98.89 OTHER SPECIFIED POSTPROCEDURAL STATES: Chronic | ICD-10-CM

## 2021-12-01 PROCEDURE — G0463: CPT | Mod: 25

## 2021-12-01 PROCEDURE — 99213 OFFICE O/P EST LOW 20 MIN: CPT | Mod: GE

## 2021-12-01 PROCEDURE — 93005 ELECTROCARDIOGRAM TRACING: CPT

## 2021-12-01 NOTE — PHYSICAL EXAM
[No Acute Distress] : no acute distress [Well Nourished] : well nourished [Well Developed] : well developed [No Respiratory Distress] : no respiratory distress  [No Accessory Muscle Use] : no accessory muscle use [Clear to Auscultation] : lungs were clear to auscultation bilaterally [Normal Rate] : normal rate  [Regular Rhythm] : with a regular rhythm [Normal S1, S2] : normal S1 and S2 [Soft] : abdomen soft [Non Tender] : non-tender [Non-distended] : non-distended [Normal Affect] : the affect was normal [Alert and Oriented x3] : oriented to person, place, and time [Normal Mood] : the mood was normal

## 2021-12-12 PROBLEM — L91.8 SKIN TAG: Status: ACTIVE | Noted: 2021-12-01

## 2021-12-12 NOTE — HEALTH RISK ASSESSMENT
[0] : 2) Feeling down, depressed, or hopeless: Not at all (0) [1/2 of Days or More (2)] : 3.) Trouble falling asleep, or sleeping too much? Half the days or more [Not at All (0)] : 8.) Moving or speaking so slowly that other people could have noticed, or the opposite, moving or speaking faster than usual? Not at all [Mild] : severity of depression is mild [Not at all] : How difficult have these problems made it for you to do your work, take care of things at home, or get along with people? Not at all [[id number]] : 4

## 2021-12-12 NOTE — HISTORY OF PRESENT ILLNESS
[FreeTextEntry1] : 37 yo F w complex psychiatric Hx presenting for a CPE. [de-identified] : 37 yo F w complex psychiatric Hx including anxiety and Autism presenting for a CPE. Since last visit patient has not been complaining of more rectal bleeding, improved constipation and improved her diet; she is no longer requiring OTC laxatives. Patient coming in with a complaint of anxiety and that the lexapro medications her psychiatrist prescribed her are not helping her. Upon further questioning, patient has been taking Lexapro 30mg daily for years as per her psychiatrist with no real improvement in her symptoms. Patient has an allergy to Geodon where her "throat closed" and required hospitalization. Patient also complaining of skin tags under her eyes and would like a dermatology referral. patient not complaining of other significant ROS no Fevers, chills, Headaches, blurry vision, CP, SOB, cpugh, N/V, ABD pain, diarrhea, constipation.

## 2021-12-12 NOTE — ASSESSMENT
[FreeTextEntry1] : 35 yo F w complex psychiatric Hx including anxiety and Autism presenting for a CPE. Information for a different psychiatric facility given; EKG in office today since patient taking Lexapro for extended period of time; Derm referral; Patient received both doses of Moderna Vaccine earlier this year \par \par #Anxiety \par - Patient not happy with current management of her psychiatric symptoms from her personal psychiatrist and would like a different psychiatrist\par - Information to different psychiatric facility ([facility]) for patient to look for a new psychiatrist was given with help of Dr. Salgado \par - QTc normal in office today \par - ROSALIA-7 score 2 , and PHQ-9 score 4 \par \par #Skin Tags \par - Dermatology referral given\par \par #HCM \par - Patient denied flu shot and TDAP this visit \par - A1c, lipid panel, vitamin D \par - Educated patient on COVID booster vaccine and strongly encouraged to get it \par - Patient states she received PAP smear within the last 5 years with a non-[facility] affiliated physician \par \par RTC in 4 months \par \par D/w Dr. Reeves \par \par

## 2021-12-12 NOTE — END OF VISIT
[] : Resident [FreeTextEntry3] : As above, patient looking for new psychiatrist. Ms RANKIN met briefly with our clinical psychologist during the visit and given additional options regarding psychiatry care. Of note, although she feels citalopram no longer working, both GAD7 and PHQ9 scores c/w remission.  \par In addition, was recently seen in ED (in October) for episode of rectal bleeding. Was discharged with referral to gastroenterologist. Ms RANKIN has appt pending. reminded of the importance of vist - and new referral provided for GI.

## 2022-01-03 ENCOUNTER — RX RENEWAL (OUTPATIENT)
Age: 37
End: 2022-01-03

## 2022-01-24 ENCOUNTER — APPOINTMENT (OUTPATIENT)
Dept: INTERNAL MEDICINE | Facility: CLINIC | Age: 37
End: 2022-01-24
Payer: MEDICAID

## 2022-01-24 ENCOUNTER — OUTPATIENT (OUTPATIENT)
Dept: OUTPATIENT SERVICES | Facility: HOSPITAL | Age: 37
LOS: 1 days | End: 2022-01-24
Payer: MEDICAID

## 2022-01-24 VITALS
WEIGHT: 251 LBS | SYSTOLIC BLOOD PRESSURE: 128 MMHG | HEIGHT: 67 IN | HEART RATE: 83 BPM | OXYGEN SATURATION: 99 % | DIASTOLIC BLOOD PRESSURE: 72 MMHG | BODY MASS INDEX: 39.39 KG/M2

## 2022-01-24 DIAGNOSIS — Z98.89 OTHER SPECIFIED POSTPROCEDURAL STATES: Chronic | ICD-10-CM

## 2022-01-24 DIAGNOSIS — I10 ESSENTIAL (PRIMARY) HYPERTENSION: ICD-10-CM

## 2022-01-24 PROCEDURE — G0463: CPT

## 2022-01-24 PROCEDURE — 99213 OFFICE O/P EST LOW 20 MIN: CPT | Mod: GE

## 2022-01-24 NOTE — PHYSICAL EXAM
[No Acute Distress] : no acute distress [Well Nourished] : well nourished [Well Developed] : well developed [Normal Sclera/Conjunctiva] : normal sclera/conjunctiva [PERRL] : pupils equal round and reactive to light [EOMI] : extraocular movements intact [Normal Oropharynx] : the oropharynx was normal [No Respiratory Distress] : no respiratory distress  [No Accessory Muscle Use] : no accessory muscle use [Clear to Auscultation] : lungs were clear to auscultation bilaterally [Normal Rate] : normal rate  [Regular Rhythm] : with a regular rhythm [Soft] : abdomen soft [Non Tender] : non-tender [Normal Affect] : the affect was normal [Normal Insight/Judgement] : insight and judgment were intact

## 2022-01-25 ENCOUNTER — NON-APPOINTMENT (OUTPATIENT)
Age: 37
End: 2022-01-25

## 2022-01-26 NOTE — ASSESSMENT
[FreeTextEntry1] : 35 yo F w PMH anxiety, depression, and autism spectrum disorder p/w request to transfer psychiatric care to clinic or to new provider.\par \par #Depression\par Patient currently on lexapro and denying symptoms of depression. Given minimal psychiatric history available on this assessment, and currently symptoms controlled and no adverse effects on lexapro, defer decision to downtitrate or change mediation to psychiatric provider. Counseled patient not to discontinue medication without consulting provider, educated on effects of rapid withdrawal of SSRI.\par - Information for [facility] provided\par - Will work with Dr. Salgado to reach out to patient to further assist in connecting to care\par \par \par #HCM\par - received Covid vaccine, does not wish to receive booster at this time. Counseling and recommendation provided.\par - Did not receive flu shot this year, does not wish to at this time. Counseling and recommendation provided.

## 2022-01-26 NOTE — REVIEW OF SYSTEMS
[Negative] : Psychiatric [Fever] : no fever [Chills] : no chills [Fatigue] : no fatigue [Recent Change In Weight] : ~T no recent weight change

## 2022-01-26 NOTE — HISTORY OF PRESENT ILLNESS
[FreeTextEntry1] : Psychiatric referral [de-identified] : 37 yo F w PMH anxiety, depression, and autism spectrum disorder p/w request to transfer psychiatric care to clinic or to new provider.\par \par Has been taking 30mg Lexapro for several years (unsure how long), feels that it is not affecting her. No depressed mood. Some difficulty falling asleep, low energy in the day. Appetite is good, normal. No hopelessness. No Si. No anxiety. \par Patient wishes to discontinue lexapro, states her psychiatrist wishes to increase dose. Patient not satisfied with current psychiatric relationship and wishes to change care providers. Patient also receiving care with outpatient counselor and at [facility] twice per week 10-2. \par \par Previously seen 12/2021 for CPE with similar complaint, met with Dr. Salgado and received information for [facility], however patient does not recall this information and did not reach out to the center.\par ROSALIA-7 score 2 , and PHQ-9 score 4 on last visit 12/2021\par \par Denies EtOH, smoking, vaping, drug use.\par \par #HCM\par Received COVID vaccine, has not received booster.\par Has not received flu shot this year

## 2022-01-27 ENCOUNTER — EMERGENCY (EMERGENCY)
Facility: HOSPITAL | Age: 37
LOS: 0 days | Discharge: HOME | End: 2022-01-27
Attending: EMERGENCY MEDICINE
Payer: MEDICAID

## 2022-01-27 VITALS
SYSTOLIC BLOOD PRESSURE: 140 MMHG | DIASTOLIC BLOOD PRESSURE: 93 MMHG | RESPIRATION RATE: 18 BRPM | TEMPERATURE: 99 F | OXYGEN SATURATION: 100 % | HEART RATE: 81 BPM

## 2022-01-27 VITALS — HEIGHT: 67 IN | WEIGHT: 210.1 LBS

## 2022-01-27 DIAGNOSIS — M79.645 PAIN IN LEFT FINGER(S): ICD-10-CM

## 2022-01-27 DIAGNOSIS — Z95.5 PRESENCE OF CORONARY ANGIOPLASTY IMPLANT AND GRAFT: Chronic | ICD-10-CM

## 2022-01-27 DIAGNOSIS — Z98.89 OTHER SPECIFIED POSTPROCEDURAL STATES: Chronic | ICD-10-CM

## 2022-01-27 DIAGNOSIS — I11.0 HYPERTENSIVE HEART DISEASE WITH HEART FAILURE: ICD-10-CM

## 2022-01-27 DIAGNOSIS — I50.9 HEART FAILURE, UNSPECIFIED: ICD-10-CM

## 2022-01-27 PROCEDURE — 99283 EMERGENCY DEPT VISIT LOW MDM: CPT | Mod: 25

## 2022-01-27 PROCEDURE — 73130 X-RAY EXAM OF HAND: CPT | Mod: 26,LT

## 2022-01-27 PROCEDURE — 73130 X-RAY EXAM OF HAND: CPT | Mod: LT

## 2022-01-27 PROCEDURE — 99283 EMERGENCY DEPT VISIT LOW MDM: CPT

## 2022-01-27 NOTE — ED STATDOCS - CARE PLAN
Principal Discharge DX:	Finger pain, left  Secondary Diagnosis:	Sprain, finger   1 Principal Discharge DX:	Finger pain, left  Goal:	xray unremarkable for any fx, nontoxic appearing, neurovascularly intact limb/fingers, outpatient follow up with ortho hand and strict return precautions provided.  Assessment and plan of treatment:	Explained to patient that xray can at times fail to diagnose hairline fractures and that to my evaluation I do not see any evidence of fx, to follow up with official report with MD or self in 24 to 48 hours, follow up with ortho hand strict return precautions provided.  Secondary Diagnosis:	Sprain, finger

## 2022-01-27 NOTE — ED STATDOCS - SKIN, MLM
skin normal color for race, warm, dry and intact. ~Manan Smith PA-C skin normal color for race, warm, dry and intact.

## 2022-01-27 NOTE — ED STATDOCS - SPECIALTY CARE
I called her to let her know that there is a request in for this medication already. Dr. Bynum has been out and just got back into day will be getting to it as soon as she can   Orthopedic Surgery

## 2022-01-27 NOTE — ED STATDOCS - ATTENDING CONTRIBUTION TO CARE
I Dave Thomas MD saw and examined the patient. MLP saw and examined the patient under my supervision. I discussed the care of the patient with MLP and agree with MLP's plan, assessment and care of the patient while in the ED.

## 2022-01-27 NOTE — ED STATDOCS - MUSCULOSKELETAL NEGATIVE STATEMENT, MLM
no back pain, no gout, no musculoskeletal pain, no neck pain, and no weakness. +Left ring finger injury,. no back pain, no gout, no musculoskeletal pain, no neck pain, and no weakness. +Left ring finger injury.

## 2022-01-27 NOTE — ED STATDOCS - CHPI ED SYMPTOM NEG
no fever no chills/no fever/no hematuria/no nausea/no numbness/no decreased eating/drinking/no dysuria/no vomiting/no weakness/no palpitations

## 2022-01-27 NOTE — ED STATDOCS - CARE PROVIDER_API CALL
Orestes Fonseca)  Orthopaedic Surgery; Surgery of the Hand  [address], [address]  [place] [address]  Phone: [phone]  Fax: [phone]  Follow Up Time:

## 2022-01-27 NOTE — ED STATDOCS - NSFOLLOWUPINSTRUCTIONS_ED_ALL_ED_FT
Please note that we have reviewed the x-ray of the hand and I do not see any evidence of fracture in the finger. Please note that this is my assessment of the x-ray and I have placed a negative wet or preliminary read and ideally we should contact you if there is  a discrepancy when radiologist reviews the images in 24 to 48 hours however this is not always the case. Please note that we have placed you in a finger splint. Please return to us immediately if you have any pain, difficulty or worsening range, or if you have any fever, chills, nausea, or any other health concerns. Please ensure that in 24 to 48 hours you have your primary or yourself check the official report of the x-ray. Please note that I have provided you with outpatient follow up with an orthopedic hand doctor. They will see you outpatient and you can call them tomorrow or walk in to be seen by them. The information is provided for you below. If any other health concerns or if any other emergencies return ot us immediately.    ______________      Finger Sprain    WHAT YOU NEED TO KNOW:    A finger sprain happens when ligaments in your finger or thumb are stretched or torn. Ligaments are the tough tissues that connect bones. Ligaments allow your hands to grasp and pinch.    DISCHARGE INSTRUCTIONS:    Return to the emergency department if:   •The skin on your injured finger looks bluish or pale (less color than normal).      •You have new weakness or numbness in your finger or thumb. It may tingle or burn.      •You have a splint that you cannot adjust and it feels too tight.      Call your doctor if:   •You have new or increased swelling or pain in your finger.      •You have new or increased stiffness when you move your injured finger.      •You have questions or concerns about your injury or treatment.      Medicines:   •Prescription pain medicine may be given. Ask your healthcare provider how to take this medicine safely. Some prescription pain medicines contain acetaminophen. Do not take other medicines that contain acetaminophen without talking to your healthcare provider. Too much acetaminophen may cause liver damage. Prescription pain medicine may cause constipation. Ask your healthcare provider how to prevent or treat constipation.       •Take your medicine as directed. Contact your healthcare provider if you think your medicine is not helping or if you have side effects. Tell him or her if you are allergic to any medicine. Keep a list of the medicines, vitamins, and herbs you take. Include the amounts, and when and why you take them. Bring the list or the pill bottles to follow-up visits. Carry your medicine list with you in case of an emergency.      Care for your finger:   •Rest your finger for at least 48 hours. Do not do activities that cause pain. Return to normal activities as directed.      •Apply ice on your finger to help decrease pain and swelling. Use an ice pack, or put crushed ice in a plastic bag. Cover the bag with a towel before you place it on your finger. Apply ice every hour for 15 to 20 minutes at a time. You may need to apply ice at least 4 to 8 times each day. Continue for as many days as directed.      •Elevate (raise) your finger above the level of your heart as often as you can. This will help decrease swelling and pain. You can elevate your hand by resting it on a pillow.             •Use a splint or compression as directed. Compression (tight hold) helps support your finger or thumb as it heals. Tape your injured finger to the finger beside it. Severe sprains may be treated with a splint. A splint prevents your finger from moving while it heals. Ask how long you must wear the splint or tape, and how to apply them.      •Do exercises as directed. You may be given gentle exercises to begin in a few days. Exercises can help decrease stiffness in your finger or thumb. Exercises also help decrease pain and swelling and improve the movement of your finger or thumb. Check with your healthcare provider before you return to your normal activities or sports.      Follow up with your doctor as directed: Write down any questions you may have to ask at your follow up visits.

## 2022-01-27 NOTE — ED STATDOCS - PROGRESS NOTE DETAILS
PA note: Xrays NEG for fracture, studies reviewed in details with patient. Finger splint applied. Patient re-examined and re-evaluated. Patient feels much better at this time. ED evaluation, Diagnosis and management discussed with the patient in detail. Workup results discussed with ED attending, OK to dc home. Close ORTHO HAND MD follow up encouraged.  Strict ED return instructions discussed in detail and patient given the opportunity to ask any questions about their discharge diagnosis and instructions. Patient verbalized understanding. ~ Manan Smith PA-C Xochitl SALGADO for attending Dr. Thomas: Dr. Rosas placed orders at my request and is not involved in the care of pt. Xochitl SALGADO for attending Dr. Thomas: 35 y/o F with PMHx of autism, HTN, alcoholism, anxiety, and depression presents to the ED c/o left 4th finger pain s/p slamming hand in door prior to arrival. Pt c/o mild tenderness on PIP and is able to flex & extend. No loss of sensation, no bruising, or laceration. No other complaints at this time. Physical Exam: normal tenderness to palpation of PIP of left ring finger. Plan: XR, assessment, and likely discharge. Xochitl SALGADO for attending Dr. Thomas: Dr. Rosas placed orders at my request and is not involved in the care of pt. Awaiting xray, reassess.

## 2022-01-27 NOTE — ED STATDOCS - PATIENT PORTAL LINK FT
You can access the FollowMyHealth Patient Portal offered by [facility] by registering at the following website: [email or web address]. By joining [employer]’s FollowMyHealth portal, you will also be able to view your health information using other applications (apps) compatible with our system.

## 2022-01-27 NOTE — ED STATDOCS - NSICDXPASTMEDICALHX_GEN_ALL_CORE_FT
PAST MEDICAL HISTORY:  Alcoholism     Anxiety     Autistic disorder     Chronic CHF     Depression     HTN (hypertension)

## 2022-01-27 NOTE — ED STATDOCS - ENMT, MLM
Nasal mucosa clear.  Mouth with normal mucosa  Throat is clear. Nasal mucosa clear.  Mouth with normal mucosa  Throat is clear. No epistaxis. No septal anomaly.

## 2022-01-27 NOTE — ED ADULT TRIAGE NOTE - CHIEF COMPLAINT QUOTE
pt presents to ED due to complaints of left hand 4th digit pain jammed it in a door today unable to bend finger

## 2022-01-27 NOTE — ED STATDOCS - CONSTITUTIONAL, MLM
PA: well appearing and in no apparent distress. normal... PA: well appearing and in no apparent distress. Nontoxic appearing. AAOx4.

## 2022-01-27 NOTE — ED STATDOCS - OBJECTIVE STATEMENT
PA: Patient is a 36 year old female with PMHx of alcoholism, autistic disorder, CHF, depression and HTN who presents to [facility] c/o left ring finger injury after she slammed it on a door at home today. Patient c/o pain and swelling. ~Manan Smith PA-C Patient is a 36 year old female with PMHx of alcoholism, autistic disorder, CHF, depression and HTN who presents to [facility] c/o left ring finger pain after she slammed it on a door at home today. Patient c/o pain and swelling. No wrist pain, elbow pain, shoulder pain. No fever or chills. No melena or hematochezia. No change in sensation or movement of the fingers other than pain. No cp, sob or palpitation. No syncope or lightheadedness. No visual or focal neurological complaints. No saddle anesthesia. No skin rash. No diarrhea or constipation. No seizures, syncope, near syncope or lightheadedness.

## 2022-01-27 NOTE — ED STATDOCS - NS_ ATTENDINGSCRIBEDETAILS _ED_A_ED_FT
I Dave Thomas MD saw and examined the patient. Scribe documented for me and under my supervision. I have modified the scribe's documentation where necessary to reflect my history, physical exam and other relevant documentations pertinent to the care of the patient.

## 2022-01-27 NOTE — ED STATDOCS - PLAN OF CARE
Explained to patient that xray can at times fail to diagnose hairline fractures and that to my evaluation I do not see any evidence of fx, to follow up with official report with MD or self in 24 to 48 hours, follow up with ortho hand strict return precautions provided. xray unremarkable for any fx, nontoxic appearing, neurovascularly intact limb/fingers, outpatient follow up with ortho hand and strict return precautions provided.

## 2022-01-27 NOTE — ED STATDOCS - MUSCULOSKELETAL, MLM
range of motion is not limited and there is no muscle tenderness. LEFT RING FINGER: +Mild STS and mild tenderness PIP joint area of left 4th digit. Painful ROM. NVI. Cap refill less than 2 sec. No tendon deficits. No bony deformity. Finger flexion/extension is normal and painless. range of motion is not limited and there is no muscle tenderness. LEFT RING FINGER: +mild tenderness PIP joint area of left 4th digit. Painful ROM but intact with intact flexion and extension at DIP, PIP. No laxity. NVI. Cap refill less than 2 sec. No tendon deficits. No bony deformity. Finger flexion/extension is normal and painless.

## 2022-01-27 NOTE — ED STATDOCS - CLINICAL SUMMARY MEDICAL DECISION MAKING FREE TEXT BOX
PA note: Xrays NEG for fracture, studies reviewed in details with patient. Finger splint applied. Patient re-examined and re-evaluated. Patient feels much better at this time. ED evaluation, Diagnosis and management discussed with the patient in detail. Workup results discussed with ED attending, OK to dc home. Close ORTHO HAND MD follow up encouraged.  Strict ED return instructions discussed in detail and patient given the opportunity to ask any questions about their discharge diagnosis and instructions. Patient verbalized understanding. ~ Manan Smith PA-C

## 2022-02-03 ENCOUNTER — NON-APPOINTMENT (OUTPATIENT)
Age: 37
End: 2022-02-03

## 2022-02-03 DIAGNOSIS — F34.1 DYSTHYMIC DISORDER: ICD-10-CM

## 2022-02-03 PROBLEM — I50.9 HEART FAILURE, UNSPECIFIED: Chronic | Status: ACTIVE | Noted: 2022-01-27

## 2022-02-04 ENCOUNTER — APPOINTMENT (OUTPATIENT)
Dept: ORTHOPEDIC SURGERY | Facility: CLINIC | Age: 37
End: 2022-02-04
Payer: MEDICAID

## 2022-02-04 ENCOUNTER — NON-APPOINTMENT (OUTPATIENT)
Age: 37
End: 2022-02-04

## 2022-02-04 ENCOUNTER — TRANSCRIPTION ENCOUNTER (OUTPATIENT)
Age: 37
End: 2022-02-04

## 2022-02-04 PROCEDURE — 99203 OFFICE O/P NEW LOW 30 MIN: CPT

## 2022-02-04 NOTE — HISTORY OF PRESENT ILLNESS
[FreeTextEntry1] : 36 year female presents for evaluation of her left ring finger injury. She reports on 1/27 she slammed her finger in a car door. SHe reports immediate swelling and pain along the PIP joint. She was seen in the ED where xrays were taken revealing no obvious acute fx. She was placed into a splint and recommended fu here. She notes her pain is mild to moderate at this time. She reports restriction of motion. She denies loss of sensation.

## 2022-02-04 NOTE — ASSESSMENT
[FreeTextEntry1] : 36-year-old female status post injury to the left ring finger consistent with PIP joint sprain. I recommend willam loop immobilization during activity, she will continue to work on range of motion of the fingers as tolerated. She should avoid strenuous activity. Follow up in 3 weeks for range of motion check and clearance.

## 2022-03-02 ENCOUNTER — APPOINTMENT (OUTPATIENT)
Dept: ORTHOPEDIC SURGERY | Facility: CLINIC | Age: 37
End: 2022-03-02
Payer: MEDICAID

## 2022-03-02 VITALS
WEIGHT: 251 LBS | HEART RATE: 97 BPM | SYSTOLIC BLOOD PRESSURE: 135 MMHG | HEIGHT: 67 IN | DIASTOLIC BLOOD PRESSURE: 82 MMHG | BODY MASS INDEX: 39.39 KG/M2

## 2022-03-02 DIAGNOSIS — S63.635D SPRAIN OF INTERPHALANGEAL JOINT OF LEFT RING FINGER, SUBSEQUENT ENCOUNTER: ICD-10-CM

## 2022-03-02 PROCEDURE — 99213 OFFICE O/P EST LOW 20 MIN: CPT

## 2022-03-02 NOTE — HISTORY OF PRESENT ILLNESS
[FreeTextEntry1] : 36 year female presents for evaluation of her left ring finger injury. She reports on 1/27 she slammed her finger in a car door. SHe reports immediate swelling and pain along the PIP joint. She was seen in the ED where xrays were taken revealing no obvious acute fx. She was placed into a splint and recommended fu here. She notes her pain is mild to moderate at this time. She reports restriction of motion. She denies loss of sensation. \par \par 03/02/2022: Patient returns to the office for reevaluation of her left ring finger PIP joint sprain. She has been participating in light activity, using willam loops for immobilization. She notes an improvement in her symptoms. She reports return of mobility and ability to make a fist. She notes continued swelling along the PIP joint, with lack of extension. She presents for ROM check and reevaluation.

## 2022-03-02 NOTE — ASSESSMENT
[FreeTextEntry1] : 36-year-old female status post injury to the left ring finger consistent with PIP joint sprain. She has been recommended for continued ROM exercise to improve mobilty. She has been cleared to return to full activity as tolerated, and to fu PRN.

## 2022-03-02 NOTE — PHYSICAL EXAM
[Normal] : Oriented to person, place, and time, insight and judgement were intact and the affect was normal [de-identified] : Left ring finger: \par Skin intact, moderate swelling, no ecchymosis, no gross deformity. \par active flexion/extension intact DIP/PIP/MCP\par no instability\par Patient able to make a composite fist. Slightly limited extension of the left ring finger. \par NVI distally\par ROM limited 2/2 stiffness, fingertip to palm distance 3 cm [de-identified] : no xray imaging taken today

## 2022-03-29 ENCOUNTER — APPOINTMENT (OUTPATIENT)
Dept: GASTROENTEROLOGY | Facility: HOSPITAL | Age: 37
End: 2022-03-29

## 2022-04-05 ENCOUNTER — RX RENEWAL (OUTPATIENT)
Age: 37
End: 2022-04-05

## 2022-07-06 ENCOUNTER — EMERGENCY (EMERGENCY)
Facility: HOSPITAL | Age: 37
LOS: 0 days | Discharge: HOME | End: 2022-07-06
Attending: EMERGENCY MEDICINE
Payer: MEDICAID

## 2022-07-06 VITALS
OXYGEN SATURATION: 96 % | HEART RATE: 72 BPM | TEMPERATURE: 98 F | RESPIRATION RATE: 18 BRPM | DIASTOLIC BLOOD PRESSURE: 80 MMHG | SYSTOLIC BLOOD PRESSURE: 130 MMHG

## 2022-07-06 VITALS — WEIGHT: 229.94 LBS | HEIGHT: 67 IN

## 2022-07-06 DIAGNOSIS — Z88.8 ALLERGY STATUS TO OTHER DRUGS, MEDICAMENTS AND BIOLOGICAL SUBSTANCES STATUS: ICD-10-CM
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
## 2022-07-06 DIAGNOSIS — I50.9 HEART FAILURE, UNSPECIFIED: ICD-10-CM

## 2022-07-06 DIAGNOSIS — Z95.5 PRESENCE OF CORONARY ANGIOPLASTY IMPLANT AND GRAFT: Chronic | ICD-10-CM
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
## 2022-07-06 DIAGNOSIS — F41.8 OTHER SPECIFIED ANXIETY DISORDERS: ICD-10-CM
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
## 2022-07-06 DIAGNOSIS — T25.222A BURN OF SECOND DEGREE OF LEFT FOOT, INITIAL ENCOUNTER: ICD-10-CM

## 2022-07-06 DIAGNOSIS — Z98.89 OTHER SPECIFIED POSTPROCEDURAL STATES: Chronic | ICD-10-CM

## 2022-07-06 DIAGNOSIS — F10.20 ALCOHOL DEPENDENCE, UNCOMPLICATED: ICD-10-CM

## 2022-07-06 DIAGNOSIS — I11.0 HYPERTENSIVE HEART DISEASE WITH HEART FAILURE: ICD-10-CM

## 2022-07-06 DIAGNOSIS — Z95.5 PRESENCE OF CORONARY ANGIOPLASTY IMPLANT AND GRAFT: ICD-10-CM

## 2022-07-06 DIAGNOSIS — F84.0 AUTISTIC DISORDER: ICD-10-CM

## 2022-07-06 PROCEDURE — 99283 EMERGENCY DEPT VISIT LOW MDM: CPT

## 2022-07-06 PROCEDURE — 99284 EMERGENCY DEPT VISIT MOD MDM: CPT

## 2022-07-06 RX ADMIN — Medication 1 APPLICATION(S): at 01:55

## 2022-07-06 NOTE — ED ADULT NURSE NOTE - NSIMPLEMENTINTERV_GEN_ALL_ED
Implemented All Universal Safety Interventions:  [place] to call system. Call bell, personal items and telephone within reach. Instruct patient to call for assistance. Room bathroom lighting operational. Non-slip footwear when patient is off stretcher. Physically safe environment: no spills, clutter or unnecessary equipment. Stretcher in lowest position, wheels locked, appropriate side rails in place.

## 2022-07-06 NOTE — ED ADULT TRIAGE NOTE - CHIEF COMPLAINT QUOTE
Pt presents to the ED c/o burns to the top of her left foot. Pt states oil splattered while cooking tonight. Ice applied without relief. No medication PTA.

## 2022-07-06 NOTE — ED PROVIDER NOTE - OBJECTIVE STATEMENT
37 y/o female in ED c/o oil burn to top of left foot tonight while cooking dinner.   pt states pain burning with blisters.   no meds used.   pt denies any ther complaints.

## 2022-07-06 NOTE — ED PROVIDER NOTE - NSFOLLOWUPINSTRUCTIONS_ED_ALL_ED_FT
please follow up with your doctor in 2-3 days.   apply silvadene daily.   take motrin and tylenol for pain.   return to ED for any concerns.   do not pop blisters.

## 2022-07-06 NOTE — ED PROVIDER NOTE - PATIENT PORTAL LINK FT
You can access the FollowMyHealth Patient Portal offered by [facility] by registering at the following website: [email or web address]. By joining [employer]’s FollowMyHealth portal, you will also be able to view your health information using other applications (apps) compatible with our system.

## 2022-07-11 ENCOUNTER — NON-APPOINTMENT (OUTPATIENT)
Age: 37
End: 2022-07-11

## 2022-07-13 ENCOUNTER — NON-APPOINTMENT (OUTPATIENT)
Age: 37
End: 2022-07-13

## 2022-07-27 ENCOUNTER — OUTPATIENT (OUTPATIENT)
Dept: OUTPATIENT SERVICES | Facility: HOSPITAL | Age: 37
LOS: 1 days | End: 2022-07-27
Payer: MEDICAID

## 2022-07-27 ENCOUNTER — APPOINTMENT (OUTPATIENT)
Dept: INTERNAL MEDICINE | Facility: CLINIC | Age: 37
End: 2022-07-27

## 2022-07-27 VITALS
OXYGEN SATURATION: 98 % | HEIGHT: 67 IN | HEART RATE: 94 BPM | DIASTOLIC BLOOD PRESSURE: 70 MMHG | BODY MASS INDEX: 36.57 KG/M2 | WEIGHT: 233 LBS | SYSTOLIC BLOOD PRESSURE: 98 MMHG

## 2022-07-27 DIAGNOSIS — Z95.5 PRESENCE OF CORONARY ANGIOPLASTY IMPLANT AND GRAFT: Chronic | ICD-10-CM

## 2022-07-27 DIAGNOSIS — I10 ESSENTIAL (PRIMARY) HYPERTENSION: ICD-10-CM

## 2022-07-27 DIAGNOSIS — Z98.89 OTHER SPECIFIED POSTPROCEDURAL STATES: Chronic | ICD-10-CM

## 2022-07-27 PROCEDURE — 99395 PREV VISIT EST AGE 18-39: CPT | Mod: GC

## 2022-07-27 PROCEDURE — G0463: CPT

## 2022-07-27 PROCEDURE — 85025 COMPLETE CBC W/AUTO DIFF WBC: CPT

## 2022-07-27 PROCEDURE — 80061 LIPID PANEL: CPT

## 2022-07-27 PROCEDURE — 83036 HEMOGLOBIN GLYCOSYLATED A1C: CPT

## 2022-07-27 PROCEDURE — 80048 BASIC METABOLIC PNL TOTAL CA: CPT

## 2022-07-28 NOTE — PLAN
[FreeTextEntry1] : 37 yo F w complex psychiatric Hx presenting for a CPE. \par \par #HCM\par Advised patient to follow up with ob/gyn for pap smear when appropriate\par patient counseled on maintaining alcohol avoidance\par Patient counseled on healthy balanced diet\par Patient counseled on TDAp and covid booster vaccines however declining vaccines at this time.\par CBC, BMP, Lipid, A1C ordered \par \par RTC in one year or if new acute event

## 2022-07-28 NOTE — END OF VISIT
[] : Resident [FreeTextEntry3] : 37yo F with PMhx of autism who presents for CPE. Exam notable for well-healed burn over right foot dorsum with overlying scab. Agree with labs.

## 2022-07-28 NOTE — HISTORY OF PRESENT ILLNESS
[FreeTextEntry1] : Comprehensive annual exam [de-identified] : 35 yo F w complex psychiatric Hx including anxiety and Autism presenting for a CPE.\par \par #Left foot burn \par Last month had apparently burned left foot with Grease splatter while cooking.  \par Initially she had blisters which popped a few weeks ago. \par Has noted that her foot has been healing well. \par \par #HCM\par Reports last pap ~2-3 years ago \par Covid vaccinated x2 given on 5/19/21 & 4/28/21 \par Declining Tdap and covid booster \par \par Diet: usually eats: eggs with spinach and mushrooms or pizza and fast food.

## 2022-07-29 LAB
ANION GAP SERPL CALC-SCNC: 11 MMOL/L
BASOPHILS # BLD AUTO: 0.04 K/UL
BASOPHILS NFR BLD AUTO: 0.5 %
BUN SERPL-MCNC: 10 MG/DL
CALCIUM SERPL-MCNC: 9.6 MG/DL
CHLORIDE SERPL-SCNC: 106 MMOL/L
CHOLEST SERPL-MCNC: 184 MG/DL
CO2 SERPL-SCNC: 25 MMOL/L
CREAT SERPL-MCNC: 0.79 MG/DL
EGFR: 99 ML/MIN/1.73M2
EOSINOPHIL # BLD AUTO: 0.1 K/UL
EOSINOPHIL NFR BLD AUTO: 1.2 %
ESTIMATED AVERAGE GLUCOSE: 108 MG/DL
GLUCOSE SERPL-MCNC: 80 MG/DL
HBA1C MFR BLD HPLC: 5.4 %
HCT VFR BLD CALC: 40.2 %
HDLC SERPL-MCNC: 43 MG/DL
HGB BLD-MCNC: 12.6 G/DL
IMM GRANULOCYTES NFR BLD AUTO: 0.7 %
LDLC SERPL CALC-MCNC: 112 MG/DL
LYMPHOCYTES # BLD AUTO: 2.45 K/UL
LYMPHOCYTES NFR BLD AUTO: 28.4 %
MAN DIFF?: NORMAL
MCHC RBC-ENTMCNC: 29.4 PG
MCHC RBC-ENTMCNC: 31.3 GM/DL
MCV RBC AUTO: 93.7 FL
MONOCYTES # BLD AUTO: 0.94 K/UL
MONOCYTES NFR BLD AUTO: 10.9 %
NEUTROPHILS # BLD AUTO: 5.03 K/UL
NEUTROPHILS NFR BLD AUTO: 58.3 %
NONHDLC SERPL-MCNC: 141 MG/DL
PLATELET # BLD AUTO: 373 K/UL
POTASSIUM SERPL-SCNC: 4 MMOL/L
RBC # BLD: 4.29 M/UL
RBC # FLD: 14.8 %
SODIUM SERPL-SCNC: 142 MMOL/L
TRIGL SERPL-MCNC: 148 MG/DL
WBC # FLD AUTO: 8.62 K/UL

## 2022-08-08 DIAGNOSIS — Z00.00 ENCOUNTER FOR GENERAL ADULT MEDICAL EXAMINATION WITHOUT ABNORMAL FINDINGS: ICD-10-CM

## 2022-08-17 NOTE — ED ADULT NURSE NOTE - PRO INTERPRETER NEED 2
English Adjacent Tissue Transfer Text: The defect edges were debeveled with a #15 scalpel blade.  Given the location of the defect and the proximity to free margins an adjacent tissue transfer was deemed most appropriate.  Using a sterile surgical marker, an appropriate flap was drawn incorporating the defect and placing the expected incisions within the relaxed skin tension lines where possible.    The area thus outlined was incised deep to adipose tissue with a #15 scalpel blade.  The skin margins were undermined to an appropriate distance in all directions utilizing iris scissors.

## 2022-08-18 ENCOUNTER — APPOINTMENT (OUTPATIENT)
Dept: INTERNAL MEDICINE | Facility: CLINIC | Age: 37
End: 2022-08-18

## 2022-08-18 ENCOUNTER — OUTPATIENT (OUTPATIENT)
Dept: OUTPATIENT SERVICES | Facility: HOSPITAL | Age: 37
LOS: 1 days | End: 2022-08-18
Payer: MEDICAID

## 2022-08-18 VITALS
HEIGHT: 67 IN | DIASTOLIC BLOOD PRESSURE: 80 MMHG | SYSTOLIC BLOOD PRESSURE: 130 MMHG | WEIGHT: 230 LBS | BODY MASS INDEX: 36.1 KG/M2 | OXYGEN SATURATION: 98 % | HEART RATE: 95 BPM

## 2022-08-18 DIAGNOSIS — F84.0 AUTISTIC DISORDER: ICD-10-CM

## 2022-08-18 DIAGNOSIS — I10 ESSENTIAL (PRIMARY) HYPERTENSION: ICD-10-CM

## 2022-08-18 DIAGNOSIS — Z95.5 PRESENCE OF CORONARY ANGIOPLASTY IMPLANT AND GRAFT: Chronic | ICD-10-CM

## 2022-08-18 DIAGNOSIS — Z98.89 OTHER SPECIFIED POSTPROCEDURAL STATES: Chronic | ICD-10-CM

## 2022-08-18 PROCEDURE — G0463: CPT

## 2022-08-18 PROCEDURE — 99213 OFFICE O/P EST LOW 20 MIN: CPT | Mod: GC

## 2022-08-19 NOTE — PHYSICAL EXAM
[Normal] : normal rate, regular rhythm, normal S1 and S2 and no murmur heard [de-identified] : Obese [de-identified] : Mallampati 2-3 [de-identified] : Mild erythema + few telangiectasia noted in malar distribution

## 2022-08-19 NOTE — PLAN
[FreeTextEntry1] : 36F with concern for sleep apnea and new rash\par \par #Sleep apnea\par - STOP BANG 4-5, high risk\par - Sleep study and sleep medicine referral given\par \par #New rash\par - Differential diagnosis includes contact dermatitis vs rosacea vs dry skin\par - Derm referral\par - Recommended to start using unscented moisturizer and sun screen. Will not start topical steroids or metronidazole now

## 2022-08-19 NOTE — HISTORY OF PRESENT ILLNESS
[FreeTextEntry8] : 36F with anxiety, autism here for:\par \par #Referral for sleep apnea\par - Boyfriend states she snores when she sleeps.\par - Episodes of 'stopping breathing' while sleeping. Boyfriend states she gasps for air while sleeping\par - Sleep apnea in the past that self resolved. Was never on CPAP.\par - Feels tired when waking up in the morning. Sleeps 3-4 hours each night\par \par #Rash on face\par - Started a couple of days ago\par - Sometimes itches, non-tender, not warm. \par - No sun exposure\par - Does not use facial creams or perfume\par - Has been using Dial soap, scented. \par - No alcohol\par - No pustules

## 2022-08-19 NOTE — END OF VISIT
[] : Resident [FreeTextEntry3] : Blanching rash of face - pt without joint pain, rash in other areas, sun sensitivity other rheumatologic symptoms. Pt not using any moisturizer or sunscreen - only dial soap. Start with moisturizer for now however vascular nature of rash was concerning so will ask her to f/u with dermatology

## 2022-08-24 ENCOUNTER — TRANSCRIPTION ENCOUNTER (OUTPATIENT)
Age: 37
End: 2022-08-24

## 2022-08-24 ENCOUNTER — NON-APPOINTMENT (OUTPATIENT)
Age: 37
End: 2022-08-24

## 2022-08-29 DIAGNOSIS — G47.33 OBSTRUCTIVE SLEEP APNEA (ADULT) (PEDIATRIC): ICD-10-CM

## 2022-08-29 DIAGNOSIS — F84.0 AUTISTIC DISORDER: ICD-10-CM

## 2022-09-28 ENCOUNTER — NON-APPOINTMENT (OUTPATIENT)
Age: 37
End: 2022-09-28

## 2022-10-03 ENCOUNTER — NON-APPOINTMENT (OUTPATIENT)
Age: 37
End: 2022-10-03

## 2022-11-15 ENCOUNTER — EMERGENCY (EMERGENCY)
Facility: HOSPITAL | Age: 37
LOS: 0 days | Discharge: HOME | End: 2022-11-15
Attending: EMERGENCY MEDICINE
Payer: MEDICAID

## 2022-11-15 VITALS
RESPIRATION RATE: 18 BRPM | DIASTOLIC BLOOD PRESSURE: 74 MMHG | SYSTOLIC BLOOD PRESSURE: 139 MMHG | TEMPERATURE: 99 F | HEART RATE: 87 BPM | OXYGEN SATURATION: 100 % | HEIGHT: 66 IN | WEIGHT: 179.9 LBS

## 2022-11-15 VITALS
DIASTOLIC BLOOD PRESSURE: 57 MMHG | RESPIRATION RATE: 18 BRPM | HEART RATE: 82 BPM | SYSTOLIC BLOOD PRESSURE: 124 MMHG | OXYGEN SATURATION: 99 %

## 2022-11-15 DIAGNOSIS — I11.0 HYPERTENSIVE HEART DISEASE WITH HEART FAILURE: ICD-10-CM

## 2022-11-15 DIAGNOSIS — R05.9 COUGH, UNSPECIFIED: ICD-10-CM

## 2022-11-15 DIAGNOSIS — Z98.89 OTHER SPECIFIED POSTPROCEDURAL STATES: Chronic | ICD-10-CM

## 2022-11-15 DIAGNOSIS — I50.9 HEART FAILURE, UNSPECIFIED: ICD-10-CM

## 2022-11-15 DIAGNOSIS — Z95.5 PRESENCE OF CORONARY ANGIOPLASTY IMPLANT AND GRAFT: Chronic | ICD-10-CM

## 2022-11-15 DIAGNOSIS — F41.9 ANXIETY DISORDER, UNSPECIFIED: ICD-10-CM

## 2022-11-15 DIAGNOSIS — R09.81 NASAL CONGESTION: ICD-10-CM

## 2022-11-15 DIAGNOSIS — Z88.8 ALLERGY STATUS TO OTHER DRUGS, MEDICAMENTS AND BIOLOGICAL SUBSTANCES STATUS: ICD-10-CM

## 2022-11-15 DIAGNOSIS — F84.0 AUTISTIC DISORDER: ICD-10-CM

## 2022-11-15 DIAGNOSIS — R07.9 CHEST PAIN, UNSPECIFIED: ICD-10-CM

## 2022-11-15 DIAGNOSIS — F10.20 ALCOHOL DEPENDENCE, UNCOMPLICATED: ICD-10-CM

## 2022-11-15 DIAGNOSIS — Z95.5 PRESENCE OF CORONARY ANGIOPLASTY IMPLANT AND GRAFT: ICD-10-CM

## 2022-11-15 DIAGNOSIS — R07.89 OTHER CHEST PAIN: ICD-10-CM

## 2022-11-15 LAB
ALBUMIN SERPL ELPH-MCNC: 3.6 G/DL — SIGNIFICANT CHANGE UP (ref 3.3–5)
ALP SERPL-CCNC: 63 U/L — SIGNIFICANT CHANGE UP (ref 40–120)
ALT FLD-CCNC: 42 U/L — SIGNIFICANT CHANGE UP (ref 12–78)
ANION GAP SERPL CALC-SCNC: 5 MMOL/L — SIGNIFICANT CHANGE UP (ref 5–17)
AST SERPL-CCNC: 27 U/L — SIGNIFICANT CHANGE UP (ref 15–37)
BASOPHILS # BLD AUTO: 0.05 K/UL — SIGNIFICANT CHANGE UP (ref 0–0.2)
BASOPHILS NFR BLD AUTO: 0.7 % — SIGNIFICANT CHANGE UP (ref 0–2)
BILIRUB SERPL-MCNC: 0.2 MG/DL — SIGNIFICANT CHANGE UP (ref 0.2–1.2)
BUN SERPL-MCNC: 6 MG/DL — LOW (ref 7–23)
CALCIUM SERPL-MCNC: 8.8 MG/DL — SIGNIFICANT CHANGE UP (ref 8.5–10.1)
CHLORIDE SERPL-SCNC: 108 MMOL/L — SIGNIFICANT CHANGE UP (ref 96–108)
CO2 SERPL-SCNC: 31 MMOL/L — SIGNIFICANT CHANGE UP (ref 22–31)
CREAT SERPL-MCNC: 0.76 MG/DL — SIGNIFICANT CHANGE UP (ref 0.5–1.3)
EGFR: 103 ML/MIN/1.73M2 — SIGNIFICANT CHANGE UP
EOSINOPHIL # BLD AUTO: 0.32 K/UL — SIGNIFICANT CHANGE UP (ref 0–0.5)
EOSINOPHIL NFR BLD AUTO: 4.2 % — SIGNIFICANT CHANGE UP (ref 0–6)
GLUCOSE SERPL-MCNC: 89 MG/DL — SIGNIFICANT CHANGE UP (ref 70–99)
HCG SERPL-ACNC: <1 MIU/ML — SIGNIFICANT CHANGE UP
HCT VFR BLD CALC: 39.2 % — SIGNIFICANT CHANGE UP (ref 34.5–45)
HGB BLD-MCNC: 12.7 G/DL — SIGNIFICANT CHANGE UP (ref 11.5–15.5)
IMM GRANULOCYTES NFR BLD AUTO: 0.7 % — SIGNIFICANT CHANGE UP (ref 0–0.9)
LIDOCAIN IGE QN: 81 U/L — SIGNIFICANT CHANGE UP (ref 73–393)
LYMPHOCYTES # BLD AUTO: 1.98 K/UL — SIGNIFICANT CHANGE UP (ref 1–3.3)
LYMPHOCYTES # BLD AUTO: 26.2 % — SIGNIFICANT CHANGE UP (ref 13–44)
MCHC RBC-ENTMCNC: 30.6 PG — SIGNIFICANT CHANGE UP (ref 27–34)
MCHC RBC-ENTMCNC: 32.4 GM/DL — SIGNIFICANT CHANGE UP (ref 32–36)
MCV RBC AUTO: 94.5 FL — SIGNIFICANT CHANGE UP (ref 80–100)
MONOCYTES # BLD AUTO: 1.22 K/UL — HIGH (ref 0–0.9)
MONOCYTES NFR BLD AUTO: 16.1 % — HIGH (ref 2–14)
NEUTROPHILS # BLD AUTO: 3.94 K/UL — SIGNIFICANT CHANGE UP (ref 1.8–7.4)
NEUTROPHILS NFR BLD AUTO: 52.1 % — SIGNIFICANT CHANGE UP (ref 43–77)
PLATELET # BLD AUTO: 388 K/UL — SIGNIFICANT CHANGE UP (ref 150–400)
POTASSIUM SERPL-MCNC: 3.3 MMOL/L — LOW (ref 3.5–5.3)
POTASSIUM SERPL-SCNC: 3.3 MMOL/L — LOW (ref 3.5–5.3)
PROT SERPL-MCNC: 7.6 GM/DL — SIGNIFICANT CHANGE UP (ref 6–8.3)
RBC # BLD: 4.15 M/UL — SIGNIFICANT CHANGE UP (ref 3.8–5.2)
RBC # FLD: 14.3 % — SIGNIFICANT CHANGE UP (ref 10.3–14.5)
SODIUM SERPL-SCNC: 144 MMOL/L — SIGNIFICANT CHANGE UP (ref 135–145)
TROPONIN I, HIGH SENSITIVITY RESULT: 6.13 NG/L — SIGNIFICANT CHANGE UP
WBC # BLD: 7.56 K/UL — SIGNIFICANT CHANGE UP (ref 3.8–10.5)
WBC # FLD AUTO: 7.56 K/UL — SIGNIFICANT CHANGE UP (ref 3.8–10.5)

## 2022-11-15 PROCEDURE — 96375 TX/PRO/DX INJ NEW DRUG ADDON: CPT

## 2022-11-15 PROCEDURE — 84702 CHORIONIC GONADOTROPIN TEST: CPT

## 2022-11-15 PROCEDURE — 96374 THER/PROPH/DIAG INJ IV PUSH: CPT

## 2022-11-15 PROCEDURE — 36415 COLL VENOUS BLD VENIPUNCTURE: CPT

## 2022-11-15 PROCEDURE — 71045 X-RAY EXAM CHEST 1 VIEW: CPT | Mod: 26

## 2022-11-15 PROCEDURE — 80053 COMPREHEN METABOLIC PANEL: CPT

## 2022-11-15 PROCEDURE — 85025 COMPLETE CBC W/AUTO DIFF WBC: CPT

## 2022-11-15 PROCEDURE — 99284 EMERGENCY DEPT VISIT MOD MDM: CPT

## 2022-11-15 PROCEDURE — 99284 EMERGENCY DEPT VISIT MOD MDM: CPT | Mod: 25

## 2022-11-15 PROCEDURE — 84484 ASSAY OF TROPONIN QUANT: CPT

## 2022-11-15 PROCEDURE — 71045 X-RAY EXAM CHEST 1 VIEW: CPT

## 2022-11-15 PROCEDURE — 83690 ASSAY OF LIPASE: CPT

## 2022-11-15 RX ORDER — FAMOTIDINE 10 MG/ML
20 INJECTION INTRAVENOUS ONCE
Refills: 0 | Status: COMPLETED | OUTPATIENT
Start: 2022-11-15 | End: 2022-11-15

## 2022-11-15 RX ORDER — KETOROLAC TROMETHAMINE 30 MG/ML
30 SYRINGE (ML) INJECTION ONCE
Refills: 0 | Status: DISCONTINUED | OUTPATIENT
Start: 2022-11-15 | End: 2022-11-15

## 2022-11-15 RX ORDER — LIDOCAINE 4 G/100G
10 CREAM TOPICAL ONCE
Refills: 0 | Status: COMPLETED | OUTPATIENT
Start: 2022-11-15 | End: 2022-11-15

## 2022-11-15 RX ORDER — ONDANSETRON 8 MG/1
4 TABLET, FILM COATED ORAL ONCE
Refills: 0 | Status: COMPLETED | OUTPATIENT
Start: 2022-11-15 | End: 2022-11-15

## 2022-11-15 RX ADMIN — Medication 30 MILLILITER(S): at 05:11

## 2022-11-15 RX ADMIN — ONDANSETRON 4 MILLIGRAM(S): 8 TABLET, FILM COATED ORAL at 05:12

## 2022-11-15 RX ADMIN — FAMOTIDINE 20 MILLIGRAM(S): 10 INJECTION INTRAVENOUS at 05:12

## 2022-11-15 RX ADMIN — Medication 30 MILLIGRAM(S): at 05:12

## 2022-11-15 RX ADMIN — LIDOCAINE 10 MILLILITER(S): 4 CREAM TOPICAL at 05:11

## 2022-11-15 NOTE — ED ADULT NURSE NOTE - OBJECTIVE STATEMENT
Patient presented to the ED c/o cough. Patient reports feeling sick for the past few days. Patient reports pain in her chest after coughing. Patient denies chest pain unrelated to cough and patient denies feeling short of breath. Patient breathing even and unlabored. Patient is alert and oriented resting comfortably in bed at this time.

## 2022-11-15 NOTE — ED ADULT TRIAGE NOTE - CHIEF COMPLAINT QUOTE
pt BIBEMS c/o cough. pt states she has been sick for the last few days and when she coughs her chest hurts. pt denies chest pain when coughing stops. pt describes pain as " burning ".  denies any other symptoms. pt alert and oriented. no signs of distress.

## 2022-11-15 NOTE — ED PROVIDER NOTE - PATIENT PORTAL LINK FT
You can access the FollowMyHealth Patient Portal offered by [facility] by registering at the following website: [email or web address]. By joining [employer]’s FollowMyHealth portal, you will also be able to view your health information using other applications (apps) compatible with our system.

## 2022-11-15 NOTE — ED PROVIDER NOTE - OBJECTIVE STATEMENT
37 y/o female in ED c/o coughing and congestion x 1 wk.   pt states tonight with coughing fit leading to burning cp.   pt states taking cough medication with no relief.   pt denies any fever, HA, sob, n/v/d/abd pain.   states no current cp.   states had cp only during coughing fit.

## 2022-11-18 ENCOUNTER — NON-APPOINTMENT (OUTPATIENT)
Age: 37
End: 2022-11-18

## 2022-11-21 ENCOUNTER — APPOINTMENT (OUTPATIENT)
Dept: INTERNAL MEDICINE | Facility: CLINIC | Age: 37
End: 2022-11-21

## 2022-11-21 ENCOUNTER — OUTPATIENT (OUTPATIENT)
Dept: OUTPATIENT SERVICES | Facility: HOSPITAL | Age: 37
LOS: 1 days | End: 2022-11-21
Payer: MEDICAID

## 2022-11-21 VITALS
WEIGHT: 229 LBS | DIASTOLIC BLOOD PRESSURE: 80 MMHG | SYSTOLIC BLOOD PRESSURE: 122 MMHG | TEMPERATURE: 97.5 F | OXYGEN SATURATION: 97 % | BODY MASS INDEX: 35.94 KG/M2 | HEIGHT: 67 IN | HEART RATE: 84 BPM

## 2022-11-21 DIAGNOSIS — Z95.5 PRESENCE OF CORONARY ANGIOPLASTY IMPLANT AND GRAFT: Chronic | ICD-10-CM

## 2022-11-21 DIAGNOSIS — J02.9 ACUTE PHARYNGITIS, UNSPECIFIED: ICD-10-CM

## 2022-11-21 DIAGNOSIS — Z98.89 OTHER SPECIFIED POSTPROCEDURAL STATES: Chronic | ICD-10-CM

## 2022-11-21 DIAGNOSIS — I10 ESSENTIAL (PRIMARY) HYPERTENSION: ICD-10-CM

## 2022-11-21 PROCEDURE — 99213 OFFICE O/P EST LOW 20 MIN: CPT | Mod: GE

## 2022-11-21 PROCEDURE — G0463: CPT

## 2022-11-21 PROCEDURE — U0005: CPT

## 2022-11-21 PROCEDURE — U0003: CPT

## 2022-11-22 ENCOUNTER — NON-APPOINTMENT (OUTPATIENT)
Age: 37
End: 2022-11-22

## 2022-11-22 DIAGNOSIS — J02.9 ACUTE PHARYNGITIS, UNSPECIFIED: ICD-10-CM

## 2022-11-22 LAB — SARS-COV-2 N GENE NPH QL NAA+PROBE: NOT DETECTED

## 2022-11-22 NOTE — HEALTH RISK ASSESSMENT
[Never] : Never [0-4] : 0-4 [Never (0 pts)] : Never (0 points) [No] : In the past 12 months have you used drugs other than those required for medical reasons? No [No falls in past year] : Patient reported no falls in the past year [0] : 2) Feeling down, depressed, or hopeless: Not at all (0) [PHQ-2 Negative - No further assessment needed] : PHQ-2 Negative - No further assessment needed [Audit-CScore] : 0 [[id number]] : 0

## 2022-11-22 NOTE — REVIEW OF SYSTEMS
[Fatigue] : fatigue [Sore Throat] : sore throat [Cough] : cough [Negative] : Heme/Lymph [Fever] : no fever [Chills] : no chills [Chest Pain] : no chest pain [Lower Ext Edema] : no lower extremity edema [Shortness Of Breath] : no shortness of breath [Dyspnea on Exertion] : no dyspnea on exertion [Abdominal Pain] : no abdominal pain [Nausea] : no nausea [Constipation] : no constipation [Diarrhea] : diarrhea [Vomiting] : no vomiting [Heartburn] : no heartburn [Muscle Pain] : no muscle pain

## 2022-11-22 NOTE — HISTORY OF PRESENT ILLNESS
[FreeTextEntry8] : 38 y/o F with PMH of autism, and anxiety presenting with cough, sore throat, and runny nose since 11/10 - 11, went to ED on 11/15.\par \par Never tested for COVID in ED, received Pfizer x 2. Has had central CP with no radiation to neck/jaw/arms, not associated with SOB, only with coughing. Cough has been improving but occasionally gets SOB. Sore throat has improved somewhat. Took Robitussin and lai seltzer, has not helped. Reports clear mucous production. No neck pain. Has not measured temps at home. Sleeping a lot, no myalgia. \par \par No fevers, chills, SOB, abd pain, nausea, vomiting, hematuria, hematochezia, melena, dysuria.

## 2022-11-24 ENCOUNTER — NON-APPOINTMENT (OUTPATIENT)
Age: 37
End: 2022-11-24

## 2022-11-28 ENCOUNTER — NON-APPOINTMENT (OUTPATIENT)
Age: 37
End: 2022-11-28

## 2022-12-21 ENCOUNTER — NON-APPOINTMENT (OUTPATIENT)
Age: 37
End: 2022-12-21

## 2022-12-22 ENCOUNTER — NON-APPOINTMENT (OUTPATIENT)
Age: 37
End: 2022-12-22

## 2023-01-04 ENCOUNTER — NON-APPOINTMENT (OUTPATIENT)
Age: 38
End: 2023-01-04

## 2023-01-12 ENCOUNTER — NON-APPOINTMENT (OUTPATIENT)
Age: 38
End: 2023-01-12

## 2023-01-16 NOTE — ED ADULT TRIAGE NOTE - DIRECT TO ROOM CARE INITIATED:
31-Dec-2017 17:16
Additional Notes: Patient consent was obtained to proceed with the visit and recommended plan of care after discussion of all risks and benefits, including the risks of COVID-19 exposure.
Detail Level: Simple

## 2023-01-18 ENCOUNTER — NON-APPOINTMENT (OUTPATIENT)
Age: 38
End: 2023-01-18

## 2023-01-24 ENCOUNTER — OUTPATIENT (OUTPATIENT)
Dept: OUTPATIENT SERVICES | Facility: HOSPITAL | Age: 38
LOS: 1 days | End: 2023-01-24
Payer: MEDICAID

## 2023-01-24 ENCOUNTER — APPOINTMENT (OUTPATIENT)
Dept: INTERNAL MEDICINE | Facility: CLINIC | Age: 38
End: 2023-01-24
Payer: MEDICAID

## 2023-01-24 VITALS
SYSTOLIC BLOOD PRESSURE: 132 MMHG | HEIGHT: 67 IN | HEART RATE: 85 BPM | DIASTOLIC BLOOD PRESSURE: 84 MMHG | OXYGEN SATURATION: 97 % | WEIGHT: 234 LBS | BODY MASS INDEX: 36.73 KG/M2

## 2023-01-24 DIAGNOSIS — Z95.5 PRESENCE OF CORONARY ANGIOPLASTY IMPLANT AND GRAFT: Chronic | ICD-10-CM

## 2023-01-24 DIAGNOSIS — I10 ESSENTIAL (PRIMARY) HYPERTENSION: ICD-10-CM

## 2023-01-24 DIAGNOSIS — L21.9 SEBORRHEIC DERMATITIS, UNSPECIFIED: ICD-10-CM

## 2023-01-24 DIAGNOSIS — Z98.89 OTHER SPECIFIED POSTPROCEDURAL STATES: Chronic | ICD-10-CM

## 2023-01-24 PROCEDURE — 99213 OFFICE O/P EST LOW 20 MIN: CPT | Mod: GE

## 2023-01-24 PROCEDURE — G0463: CPT

## 2023-01-24 RX ORDER — HYDROCORTISONE 0.5 %
0.5 OINTMENT (GRAM) TOPICAL TWICE DAILY
Qty: 1 | Refills: 0 | Status: DISCONTINUED | COMMUNITY
Start: 2023-01-24 | End: 2023-01-24

## 2023-01-24 RX ORDER — ESCITALOPRAM OXALATE 20 MG/1
20 TABLET ORAL DAILY
Qty: 30 | Refills: 1 | Status: DISCONTINUED | COMMUNITY
Start: 2023-01-24 | End: 2023-01-24

## 2023-01-24 RX ORDER — ESCITALOPRAM OXALATE 10 MG/1
10 TABLET ORAL DAILY
Qty: 60 | Refills: 1 | Status: DISCONTINUED | COMMUNITY
End: 2023-01-24

## 2023-01-25 RX ORDER — ESCITALOPRAM OXALATE 20 MG/1
20 TABLET ORAL TWICE DAILY
Qty: 60 | Refills: 1 | Status: DISCONTINUED | COMMUNITY
Start: 2023-01-24 | End: 2023-01-25

## 2023-01-25 NOTE — REVIEW OF SYSTEMS
[Fever] : no fever [Chest Pain] : no chest pain [Palpitations] : no palpitations [Lower Ext Edema] : no lower extremity edema [Abdominal Pain] : no abdominal pain [Nausea] : no nausea [Dysuria] : no dysuria [Hematuria] : no hematuria [FreeTextEntry7] : As per HPI, three episodes of bloody stools [de-identified] : H

## 2023-01-25 NOTE — PHYSICAL EXAM
[No Acute Distress] : no acute distress [No Lymphadenopathy] : no lymphadenopathy [No Respiratory Distress] : no respiratory distress  [No Accessory Muscle Use] : no accessory muscle use [Normal Rate] : normal rate  [Regular Rhythm] : with a regular rhythm [Normal S1, S2] : normal S1 and S2 [No Edema] : there was no peripheral edema [Soft] : abdomen soft [Non Tender] : non-tender [Non-distended] : non-distended [Normal Supraclavicular Nodes] : no supraclavicular lymphadenopathy [Normal Posterior Cervical Nodes] : no posterior cervical lymphadenopathy [Normal Anterior Cervical Nodes] : no anterior cervical lymphadenopathy [Speech Grossly Normal] : speech grossly normal [Normal Affect] : the affect was normal [de-identified] : obese katieus [de-identified] : limited due to obese habitus, but grossly clear to auscultation bilaterally but difficult to discern [de-identified] : facial malar rash characterized by macular erythema/somewhat shiny

## 2023-01-25 NOTE — ASSESSMENT
[FreeTextEntry1] : 37 F with complex psychiatric history including anxiety and Autism presenting for follow up and medication renewal. Patient says that she needs renewal of her Lexapro 10mg and escitalopram 20mg. Additionally, she says she has been experiencing a facial skin rash that appears to be seborrheic dermatitis. Additionally, she recently experienced 3 episodes total every other day within the past few days of non-watery bloody stools, but without pain.\par \par \par #Seborrheic dermatitis involving malar aspect of face\par -Dermatology referral provided\par -Will prescribed hydrocortisone ointment 0.5%\par \par #History of anxiety\par -Unclear why patient is seeking Lexapro 10mg/escitalopram 20mg renewal\par -After reviewing her meds, it seems escitalopram 20mg qd renewal would be appropriate for now as it is unclear if she has been taking lexparo 10mg (which are same meds, so techinically she might have been taking 30mg) \par -Will have to call patient to obtain psych med re-eval\par \par #History Vitamin D insufficiency\par -Will renew vitamin D supplements\par \par #Three episodes of isolated bloody stools. Ddx: internal hemorrhoids. Low suspicion for cancer. Family history of grandfather with pancreatic cancer. No weight loss. \par -Will follow up during next visit given late arrival for appt and time constraints\par -Consider CBC to rule out anemia as well\par -Will advise patient to return if has repeated episodes and to go to ED if watery bloody diarrhea or systemic signs such as fever\par \par #HCM\par -Follow up during next appt\par \par Discussed case and management with Dr. Jarrod Wolf

## 2023-01-25 NOTE — HISTORY OF PRESENT ILLNESS
[Other: _____] : [unfilled] [FreeTextEntry1] : Follow up Med renewal  [de-identified] : 37 F with complex psychiatric history including anxiety and Autism presenting for follow up and medication referral. Patient says that she needs renewal of her Lexapro 10mg and escitalopram 20mg. Additionally, she says she has been experiencing a facial skin rash for many months months that appears to be overall worsening despite use of a cream, the name of which she does not know. However, patient states that sometimes her rash improves but without resolution. Patient also recently experienced 3 episodes total every other day within the past few days of non-watery bloody stools, but without pain. She says she was not having her period during that incident. Also, she has noted to have diarrhea whenever she stays over in [place], but resolves when she comes back to stay with your boyfriend/ at a different location. She did not eat beets recently and inquired if eating cheese could have caused it. However, she has eaten cheese previously without issues.  Otherwise, patient reports absence of fevers, chest pain, palpitations, abdominal pain, nausea, hematochezia, dysuria, hematuria, or lower extremity edema.\par

## 2023-01-26 DIAGNOSIS — L21.9 SEBORRHEIC DERMATITIS, UNSPECIFIED: ICD-10-CM

## 2023-01-26 DIAGNOSIS — F34.1 DYSTHYMIC DISORDER: ICD-10-CM

## 2023-03-14 ENCOUNTER — NON-APPOINTMENT (OUTPATIENT)
Age: 38
End: 2023-03-14

## 2023-03-23 ENCOUNTER — APPOINTMENT (OUTPATIENT)
Dept: OPHTHALMOLOGY | Facility: CLINIC | Age: 38
End: 2023-03-23

## 2023-05-30 ENCOUNTER — RX RENEWAL (OUTPATIENT)
Age: 38
End: 2023-05-30

## 2023-06-02 ENCOUNTER — NON-APPOINTMENT (OUTPATIENT)
Age: 38
End: 2023-06-02

## 2023-06-02 ENCOUNTER — APPOINTMENT (OUTPATIENT)
Dept: OPHTHALMOLOGY | Facility: CLINIC | Age: 38
End: 2023-06-02
Payer: MEDICAID

## 2023-06-02 PROCEDURE — 92004 COMPRE OPH EXAM NEW PT 1/>: CPT

## 2023-06-09 NOTE — ED ADULT TRIAGE NOTE - AS HEIGHT TYPE
Rx Refill Note  Requested Prescriptions     Pending Prescriptions Disp Refills    apixaban (ELIQUIS) 2.5 MG tablet tablet 180 tablet 3     Sig: Take 1 tablet by mouth Every 12 (Twelve) Hours. Indications: Other - full anticoagulation      Last office visit with prescribing clinician: 4/20/2023   Last telemedicine visit with prescribing clinician: Visit date not found   Next office visit with prescribing clinician: 10/19/2023                         Would you like a call back once the refill request has been completed: [] Yes [] No    If the office needs to give you a call back, can they leave a voicemail: [] Yes [] No    Kamryn Harrison MA  06/09/23, 12:39 EDT   
stated

## 2023-06-26 NOTE — ED BEHAVIORAL HEALTH ASSESSMENT NOTE - PREPARATORY ACTS:
TRAVON RAMOS is a 36y  now POD#2 s/p  section at 36 w 5d gestation stat under general anesthesia 2/2 breech and in labor (10 cm); extensive uterine extensions to cervix noted.    -Vital signs stable  -Hgb:12>11  -Voiding, tolerating PO, bowel function nml   -Advance care as tolerated   -Continue routine postpartum and postoperative care and education;  -DVT ppx SCDs; no lovenox   -Dispo: Pt is stable, doing well and meeting all postpartum and postoperative milestones. Possible discharge to home today pending attending approval.   TRAVON RAMOS is a 36y  now POD#2 s/p  section at 36 w 5d gestation stat under general anesthesia 2/2 breech and in labor (10 cm); extensive uterine extensions to cervix noted.  s/p ancef/flagyl  mild tachycardia resolved  PECwoSF, BP stable, no PEC symptoms  -Vital signs stable  -Hgb:12>11  -Voiding, tolerating PO, bowel function nml   -Advance care as tolerated   -Continue routine postpartum and postoperative care and education;  -DVT ppx SCDs; no lovenox   -Dispo: Pt is stable, doing well and meeting all postpartum and postoperative milestones. Possible discharge to home today pending attending approval.   TRAVON RAMOS is a 36y  now POD#2 s/p  section at 36 w 5d gestation stat under general anesthesia 2/2 breech and in labor (10 cm); extensive uterine extensions to cervix noted.  s/p ancef/flagyl for BV  mild tachycardia resolved  PECwoSF, BP stable, no PEC symptoms  -Vital signs stable  -Hgb:12>11  -Voiding, tolerating PO, bowel function nml   -Advance care as tolerated   -Continue routine postpartum and postoperative care and education;  -DVT ppx SCDs; lovenox held for now  -Dispo: Pt is stable, doing well and meeting all postpartum and postoperative milestones. Possible discharge to home today pending attending approval.   None known

## 2023-07-27 ENCOUNTER — NON-APPOINTMENT (OUTPATIENT)
Age: 38
End: 2023-07-27

## 2023-07-28 ENCOUNTER — APPOINTMENT (OUTPATIENT)
Dept: INTERNAL MEDICINE | Facility: CLINIC | Age: 38
End: 2023-07-28
Payer: MEDICAID

## 2023-07-28 VITALS
WEIGHT: 239 LBS | OXYGEN SATURATION: 97 % | DIASTOLIC BLOOD PRESSURE: 84 MMHG | HEART RATE: 85 BPM | SYSTOLIC BLOOD PRESSURE: 132 MMHG | BODY MASS INDEX: 37.51 KG/M2 | HEIGHT: 67 IN

## 2023-07-28 DIAGNOSIS — F34.1 DYSTHYMIC DISORDER: ICD-10-CM

## 2023-07-28 DIAGNOSIS — G47.33 OBSTRUCTIVE SLEEP APNEA (ADULT) (PEDIATRIC): ICD-10-CM

## 2023-07-28 DIAGNOSIS — Z00.00 ENCOUNTER FOR GENERAL ADULT MEDICAL EXAMINATION W/OUT ABNORMAL FINDINGS: ICD-10-CM

## 2023-07-28 PROCEDURE — 99395 PREV VISIT EST AGE 18-39: CPT | Mod: GE

## 2023-07-28 RX ORDER — ESCITALOPRAM OXALATE 20 MG/1
20 TABLET ORAL DAILY
Qty: 30 | Refills: 3 | Status: ACTIVE | COMMUNITY
Start: 2023-01-25 | End: 1900-01-01

## 2023-07-28 RX ORDER — HYDROCORTISONE 0.5 %
0.5 OINTMENT (GRAM) TOPICAL TWICE DAILY
Qty: 1 | Refills: 0 | Status: COMPLETED | COMMUNITY
Start: 2023-01-24 | End: 2023-07-28

## 2023-07-28 RX ORDER — DESOGESTREL AND ETHINYL ESTRADIOL 0.15-0.03
0.15-3 KIT ORAL
Qty: 28 | Refills: 0 | Status: COMPLETED | COMMUNITY
Start: 2023-05-30 | End: 2023-07-28

## 2023-07-28 RX ORDER — DESOGESTREL AND ETHINYL ESTRADIOL 0.15-0.03
0.15-3 KIT ORAL DAILY
Qty: 1 | Refills: 0 | Status: COMPLETED | COMMUNITY
Start: 2023-05-04 | End: 2023-07-28

## 2023-07-28 RX ORDER — ESCITALOPRAM OXALATE 10 MG/1
10 TABLET ORAL DAILY
Qty: 60 | Refills: 1 | Status: ACTIVE | COMMUNITY
Start: 2023-01-25 | End: 1900-01-01

## 2023-07-28 NOTE — ASSESSMENT
[FreeTextEntry1] : 38 yo female with h/o of high functioning Autism and depression who presents for CPE.\par \par #Depression \par - h/o depression\par - cw therapy with [facility]\par - Renewed Lexapro 10mg/20mg \par \par #Vit D Deficiency\par - Renewed Vit D supplements \par \par #Daytime somnolence \par - nocturnal snoring and daytime fatigue\par - Recommended DIANA workup\par - Followup Sleep study \par \par #Obesity \par - BMI 37, increasing weight \par - Discussed diet changes and increasing exercise\par - Referred to Medical Weight Management team \par - will keep a food diary \par \par #Social Issues\par - Associated with [employer] [place] and [employer] in [place]\par - income from SSI \par - SDOH: Gracia to follow up with resources \par \par #HCM\par - COVID updated \par - Dental referral, optometry evaluated June 2023 \par - GYN referral \par - Obesity weight management \par - Immunizations: TDAP due 2025, flu follow-up \par - Mammogram due at 38 yo \par - follow-up routine blood work \par \par Case discussed with Dr. Rick\par RTC in 1 year\par SM

## 2023-07-28 NOTE — PHYSICAL EXAM
[No Acute Distress] : no acute distress [Well Nourished] : well nourished [Well Developed] : well developed [Well-Appearing] : well-appearing [Normal Sclera/Conjunctiva] : normal sclera/conjunctiva [PERRL] : pupils equal round and reactive to light [EOMI] : extraocular movements intact [Normal Outer Ear/Nose] : the outer ears and nose were normal in appearance [Normal Oropharynx] : the oropharynx was normal [No JVD] : no jugular venous distention [No Lymphadenopathy] : no lymphadenopathy [Supple] : supple [Thyroid Normal, No Nodules] : the thyroid was normal and there were no nodules present [No Respiratory Distress] : no respiratory distress  [No Accessory Muscle Use] : no accessory muscle use [Clear to Auscultation] : lungs were clear to auscultation bilaterally [Normal Rate] : normal rate  [Regular Rhythm] : with a regular rhythm [Normal S1, S2] : normal S1 and S2 [No Murmur] : no murmur heard [No Carotid Bruits] : no carotid bruits [No Abdominal Bruit] : a ~M bruit was not heard ~T in the abdomen [No Varicosities] : no varicosities [Pedal Pulses Present] : the pedal pulses are present [No Edema] : there was no peripheral edema [No Palpable Aorta] : no palpable aorta [No Extremity Clubbing/Cyanosis] : no extremity clubbing/cyanosis [Soft] : abdomen soft [Non Tender] : non-tender [Non-distended] : non-distended [No Masses] : no abdominal mass palpated [No HSM] : no HSM [Normal Bowel Sounds] : normal bowel sounds [Normal Posterior Cervical Nodes] : no posterior cervical lymphadenopathy [Normal Anterior Cervical Nodes] : no anterior cervical lymphadenopathy [No CVA Tenderness] : no CVA  tenderness [No Spinal Tenderness] : no spinal tenderness [No Joint Swelling] : no joint swelling [Grossly Normal Strength/Tone] : grossly normal strength/tone [No Rash] : no rash [Coordination Grossly Intact] : coordination grossly intact [No Focal Deficits] : no focal deficits [Normal Gait] : normal gait [Deep Tendon Reflexes (DTR)] : deep tendon reflexes were 2+ and symmetric [Normal Affect] : the affect was normal [Normal Insight/Judgement] : insight and judgment were intact [de-identified] : obese

## 2023-07-28 NOTE — HEALTH RISK ASSESSMENT
[HIV Test offered] : HIV Test offered [Hepatitis C test offered] : Hepatitis C test offered [Never] : Never [0-4] : 0-4

## 2023-07-28 NOTE — HISTORY OF PRESENT ILLNESS
[de-identified] : \par - Recent stressors: Friend passed away 1 week ago, 2023 Grandmother , big scene\par - sexually active take BCP \par - relationship with niece \par - liquor occassional, h/o of alcohol use but none since the past several years

## 2023-08-01 LAB
ALBUMIN SERPL ELPH-MCNC: 4.4 G/DL
ALP BLD-CCNC: 75 U/L
ALT SERPL-CCNC: 19 U/L
ANION GAP SERPL CALC-SCNC: 12 MMOL/L
AST SERPL-CCNC: 16 U/L
BILIRUB SERPL-MCNC: <0.2 MG/DL
BUN SERPL-MCNC: 13 MG/DL
CALCIUM SERPL-MCNC: 10 MG/DL
CHLORIDE SERPL-SCNC: 104 MMOL/L
CHOLEST SERPL-MCNC: 189 MG/DL
CO2 SERPL-SCNC: 23 MMOL/L
CREAT SERPL-MCNC: 0.79 MG/DL
EGFR: 98 ML/MIN/1.73M2
ESTIMATED AVERAGE GLUCOSE: 114 MG/DL
GLUCOSE SERPL-MCNC: 92 MG/DL
HBA1C MFR BLD HPLC: 5.6 %
HDLC SERPL-MCNC: 56 MG/DL
LDLC SERPL CALC-MCNC: 105 MG/DL
NONHDLC SERPL-MCNC: 133 MG/DL
POTASSIUM SERPL-SCNC: 4.2 MMOL/L
PROT SERPL-MCNC: 7.1 G/DL
SODIUM SERPL-SCNC: 140 MMOL/L
TRIGL SERPL-MCNC: 163 MG/DL
TSH SERPL-ACNC: 2.48 UIU/ML

## 2023-08-04 ENCOUNTER — APPOINTMENT (OUTPATIENT)
Dept: OPHTHALMOLOGY | Facility: CLINIC | Age: 38
End: 2023-08-04

## 2023-10-04 NOTE — ED ADULT NURSE NOTE - NSFALLRSKASSESSDT_ED_ALL_ED
DISCHARGE SUMMARY    Jessie Gilliam was seen  3 times for evaluation and treatment.  Patient did not return for further treatment and current status is unknown.  The following is from last visit seen-States she has not done much of her HEP due to vacation. States she has not had any leaks but does still have post void dribble about 50% of voids. Has been doing elimination ex only part of the time. No pad use. Void times during day about 1.5 to 2 hrs. Urgency- better control/knows what to do but still has to work on. Does feel empty after voiding.   Please see goal flow sheet from episode noted date below and initial evaluation for further information.  Patient is discharged from therapy and therapy episode is resolved as of 10/04/23.      Linked Episodes   Type: Episode: Status: Noted: Resolved: Last update: Updated by:   PHYSICAL THERAPY pelvic floor Active 5/15/2023  6/5/2023  8:06 AM Erin Ferreira, PT      Comments:         
15-Nov-2022 06:11

## 2023-10-17 ENCOUNTER — NON-APPOINTMENT (OUTPATIENT)
Age: 38
End: 2023-10-17

## 2023-11-28 ENCOUNTER — NON-APPOINTMENT (OUTPATIENT)
Age: 38
End: 2023-11-28

## 2023-12-17 NOTE — ED PROCEDURE NOTE - CPROC ED ANATOMIC LOCATION1
finger Refusing supplementation  Provide food preferences   Monitor liver enzymes and await results of testing.    Refusing supplementation  Provide food preferences   Monitor liver labs and await results of testing.

## 2023-12-27 ENCOUNTER — NON-APPOINTMENT (OUTPATIENT)
Age: 38
End: 2023-12-27

## 2024-03-07 RX ORDER — MULTIVIT-MIN/FOLIC/VIT K/LYCOP 400-300MCG
25 MCG TABLET ORAL
Qty: 90 | Refills: 3 | Status: ACTIVE | COMMUNITY
Start: 2019-10-18 | End: 1900-01-01

## 2024-03-19 NOTE — ED PROVIDER NOTE - PSYCHIATRIC [+], MLM
Mr Delaney is s/p a right lower lobe segmentectomy for Stage IIIA squamous cell carcinoma. He completed neoadjuvant chemotherapy and up until recently was on Tecentriq. He has had ongoing fevers and fatigue for the last few months. He has been evaluated by ID and has also had bronchoscopy with BAL and there is no evidence of infectious cause. He did have some inflammatory changes on his CT scan in November that have resolved on most recent CT chest. He notes continued fevers and thus his Tecentriq was stopped as this may be due to the medication. From a thoracic surgery standpoint his CT chest shows resolution of previous inflammatory changes and thus will return to normal surveillance with repeat CT chest and f/u in 6 months. I will discuss with pulmonology (Hailey AMIN), infectious disease (Dr Warner) and medical oncology (Dr Duncan) as well as the patient's PCP (Dr Lopez) to assess if any further work-up of these fevers is warranted given that they resolved while on Cipro but have again come back. Myself or a partner of mine will call him with recommendations. He will follow-up in 6 months for continued surveillance.    DEPRESSION

## 2024-03-20 ENCOUNTER — NON-APPOINTMENT (OUTPATIENT)
Age: 39
End: 2024-03-20

## 2024-04-04 ENCOUNTER — NON-APPOINTMENT (OUTPATIENT)
Age: 39
End: 2024-04-04

## 2024-05-22 NOTE — ED ADULT TRIAGE NOTE - ACCOMPANIED BY
Multilayer Compression Wrap   Below the Knee    NAME:  Trevin Garcia  YOB: 1954  MEDICAL RECORD NUMBER:  [id number]  DATE:  5/22/2024    Multilayer compression wrap: Removed old Multilayer wrap if indicated and wash leg with mild soap/water.  Applied moisturizing agent to dry skin as needed.   Applied primary and secondary dressing as ordered.  Applied multilayered dressing below the knee to right lower leg.  Applied multilayered dressing below the knee to left lower leg.  Instructed patient/caregiver not to remove dressing and to keep it clean and dry.   Instructed patient/caregiver on complications to report to provider, such as pain, numbness in toes, heavy drainage, and slippage of dressing.  Instructed patient on purpose of compression dressing and on activity and exercise recommendations.     Applied  2 layer wrap from toes to knee overlapping each time    Electronically signed by Altagracia Trevizo RN on 5/22/2024 at 10:48 AM        
Discharge instructions given.  Patient verbalized understanding.  Return to [facility] in 1 week(s).  
Self

## 2024-06-06 ENCOUNTER — APPOINTMENT (OUTPATIENT)
Dept: OPHTHALMOLOGY | Facility: CLINIC | Age: 39
End: 2024-06-06

## 2024-06-17 ENCOUNTER — APPOINTMENT (OUTPATIENT)
Dept: OPHTHALMOLOGY | Facility: CLINIC | Age: 39
End: 2024-06-17
Payer: MEDICAID

## 2024-06-17 ENCOUNTER — NON-APPOINTMENT (OUTPATIENT)
Age: 39
End: 2024-06-17

## 2024-06-17 PROCEDURE — 92004 COMPRE OPH EXAM NEW PT 1/>: CPT

## 2024-06-18 NOTE — ED ADULT TRIAGE NOTE - PAIN RATING/NUMBER SCALE (0-10): REST
Blood pressure is relatively well-controlled.  Will increase her nifedipine from 90 mg to 120 mg daily in addition to her current other antihypertensive medications.   5

## 2024-07-29 ENCOUNTER — APPOINTMENT (OUTPATIENT)
Dept: INTERNAL MEDICINE | Facility: CLINIC | Age: 39
End: 2024-07-29

## 2024-07-30 ENCOUNTER — EMERGENCY (EMERGENCY)
Facility: HOSPITAL | Age: 39
LOS: 0 days | Discharge: HOME | End: 2024-07-30
Attending: STUDENT IN AN ORGANIZED HEALTH CARE EDUCATION/TRAINING PROGRAM
Payer: MEDICAID

## 2024-07-30 VITALS — WEIGHT: 229.94 LBS | HEIGHT: 67 IN

## 2024-07-30 VITALS — RESPIRATION RATE: 18 BRPM | OXYGEN SATURATION: 100 % | HEART RATE: 65 BPM

## 2024-07-30 DIAGNOSIS — F32.A DEPRESSION, UNSPECIFIED: ICD-10-CM

## 2024-07-30 DIAGNOSIS — M25.561 PAIN IN RIGHT KNEE: ICD-10-CM

## 2024-07-30 DIAGNOSIS — I50.9 HEART FAILURE, UNSPECIFIED: ICD-10-CM

## 2024-07-30 DIAGNOSIS — F84.0 AUTISTIC DISORDER: ICD-10-CM

## 2024-07-30 DIAGNOSIS — Z95.5 PRESENCE OF CORONARY ANGIOPLASTY IMPLANT AND GRAFT: Chronic | ICD-10-CM

## 2024-07-30 DIAGNOSIS — F41.9 ANXIETY DISORDER, UNSPECIFIED: ICD-10-CM

## 2024-07-30 DIAGNOSIS — Z98.89 OTHER SPECIFIED POSTPROCEDURAL STATES: Chronic | ICD-10-CM

## 2024-07-30 DIAGNOSIS — Z88.8 ALLERGY STATUS TO OTHER DRUGS, MEDICAMENTS AND BIOLOGICAL SUBSTANCES: ICD-10-CM

## 2024-07-30 PROCEDURE — 73564 X-RAY EXAM KNEE 4 OR MORE: CPT | Mod: 26,RT

## 2024-07-30 PROCEDURE — 99283 EMERGENCY DEPT VISIT LOW MDM: CPT | Mod: 25

## 2024-07-30 PROCEDURE — 73564 X-RAY EXAM KNEE 4 OR MORE: CPT | Mod: RT

## 2024-07-30 PROCEDURE — 99284 EMERGENCY DEPT VISIT MOD MDM: CPT

## 2024-07-30 RX ORDER — ACETAMINOPHEN 325 MG
650 TABLET ORAL ONCE
Refills: 0 | Status: COMPLETED | OUTPATIENT
Start: 2024-07-30 | End: 2024-07-30

## 2024-07-30 RX ADMIN — Medication 650 MILLIGRAM(S): at 22:44

## 2024-07-30 NOTE — ED ADULT TRIAGE NOTE - CHIEF COMPLAINT QUOTE
Pt presents from home complaining R knee pain x4 days. Denies recent falls or injuries. R knee appears mildy red with no inflammation. Took Advil without relief.

## 2024-07-30 NOTE — ED ADULT NURSE NOTE - CAS ELECT INFOMATION PROVIDED
Anesthesia Post Evaluation    Patient: Tatyana Bello    Procedure(s) Performed: Procedure(s) (LRB):  LAPAROSCOPY, DIAGNOSTIC (N/A)    Final Anesthesia Type: general  Patient location during evaluation: PACU  Patient participation: Yes- Able to Participate  Level of consciousness: awake and alert  Post-procedure vital signs: reviewed and stable  Pain management: adequate  Airway patency: patent  PONV status at discharge: No PONV  Anesthetic complications: no      Cardiovascular status: blood pressure returned to baseline  Respiratory status: unassisted  Hydration status: euvolemic  Follow-up not needed.        Visit Vitals  /83   Pulse 60   Temp 36.6 °C (97.9 °F) (Skin)   Resp 16   Ht 5' (1.524 m)   Wt 47.6 kg (105 lb)   SpO2 (!) 91%   Breastfeeding? No   BMI 20.51 kg/m²       Pain/Sergio Score: Pain Rating Prior to Med Admin: 4 (12/19/2018  8:50 AM)  Sergio Score: 8 (12/19/2018  8:35 AM)         DC instructions

## 2024-07-30 NOTE — ED STATDOCS - CLINICAL SUMMARY MEDICAL DECISION MAKING FREE TEXT BOX
38-year-old female with past medical history of anxiety/depression presents ED complaining of several days of atraumatic right knee pain.  Patient denies fever, numbness or tingling, redness of the joint, swelling of the joint.  On exam patient hemodynamically stable, tenderness to the medial joint line, no crepitus or deformity, no erythema or joint effusion.  Low suspicion for fracture or tendon injury, more concerning for meniscal injury.  Patient will get x-ray to rule out fracture.  Patient will likely require orthopedic follow-up for nonemergent MRI.

## 2024-07-30 NOTE — ED STATDOCS - PRO INTERPRETER NEED 2
WELL ADOLESCENT (12 yrs and older) CHECK     Subjective:     CC/HPI: 11 y.o.female here for well child check. No parental or patient concerns at this time.    ROS:  - Diet: No concerns. Eats veggies, not much fruit. Likes in and out  - Fast food, soda, juice intake: FF 1x/week, 1 soda/day, not much juice  - Calcium intake: milk  - Dental: + brushes teeth. Sees the dentist regularly.  - Sleep concerns (duration, snoring, bedtime): none  - Elimination concerns (including menses in females): started period 8 years old.    Risk Assessment (non-confidential):  - Has never fainted before.  - No h/o cough, chest pain, or shortness of breath with exercise.  - Has never had a significant head injury.  - No family history of someone dying suddenly while exercising.  - No family history of MI before age 55. Grandma had a stroke at age 53, unknown reason    Risk Assessment (confidential):  Home: Feels safe at home. Family members all get along, more or less.  Education/Employment: School is going well, in orchestra. No problems with safety or bullying at school.  Eating: No concerns about body appearance. Getting sufficient calcium in diet (at least 4 servings per day). No dietary restrictions.   Activities: Enjoys hanging out with friends. Screen time >2 hours/day. She does a lot of walking. Enjoys kayaking  Drugs: No history of tobacco, EtOH, or drug use. No friends are using these substances.  Safety: No history of violent relationships at home or elsewhere.  Sex: Has not been sexually active yet.  Suicidality/Mental Health: No concerns. Sleeps well at night.    PM/SH:  Normal pregnancy and delivery. No surgeries, hospitalizations, or serious illnesses to date.    OB/GYN Hx:  Menses started at age 8, and is regular. She goes through 6 tampons/pads a day. Mother's menses started around 11 years old.     Social Hx:  - No smokers in the home.  - No TB or lead risk factors.  - Plans after high school: Wants to be a  ".    Immunizations:  - Up to date.    Objective:     Ambulatory Vitals  Encounter Vitals  Temperature: 36.5 °C (97.7 °F)  Temp src: Temporal  Blood Pressure: 103/68  Pulse: 74  Respiration: 20  Pulse Oximetry: 94 %  Weight: 49.4 kg (109 lb)  Height: 154.9 cm (5' 1\")  BMI (Calculated): 20.6  74 %ile (Z= 0.64) based on CDC (Girls, 2-20 Years) BMI-for-age based on BMI available as of 2024.    GEN: Normal general appearance. NAD.  HEAD: NCAT.  EYES: PERRL, red reflex present bilaterally. Light reflex symmetric. EOMI.  ENT: TMs and nares normal. MMM. Normal gums, mucosa, palate, OP. Good dentition.  NECK: Supple, with no masses.  CV: RRR, no m/r/g.  LUNGS: CTAB, no w/r/c.  ABD: Soft, NT/ND, NBS, no masses or organomegaly.  : deferred  SKIN: WWP. No skin rashes or abnormal lesions.  MSK: No deformities or signs of scoliosis. Normal gait. No clubbing, cyanosis, or edema.  NEURO: Normal muscle strength and tone. No focal deficits.    Labs/Studies:  - Hearing screen normal.  - Snellen testin/15 and 20/20    Assessment & Plan:     Healthy 11 y.o.female adolescent. Weight 68%ile, length 46%ile, and BMI 73%ile.  - Follow in one year, or sooner PRN.  - ER/return precautions discussed.    Problem   Menorrhagia With Regular Cycle   History of Early Menarche    Borderline early menarche at age 8, unclear when secondary characteristics started     Dysmenorrhea     Problem List Items Addressed This Visit       Menorrhagia with regular cycle     Pt reports menorrhagia requiring 6 pads/day during her period, she also has some light headedness but has never fallen or fainted. She reports her period is very painful, she takes midol which helps a little. She does get headaches that she describes as migraines which may include visual aura but only during her period.  - labs ordered  - will discuss depo with pt and mother         Relevant Orders    TSH WITH REFLEX TO FT4    VON WILLEBRAND'S PROFILE    CBC WITH DIFFERENTIAL "    PT AND PTT    History of early menarche     Discussed with ob/gyn, will order 17-ohp and vdw (for menorrhagia), also recommended f/u with ob/gyn.  - Labs ordered          Relevant Orders    17-OH PROGESTERONE    Dysmenorrhea     Discussed possibility of hormonal contraception to treat dysmenorrhea. Pt is not sexually active and does not need birth control.  - Pt to RTC for further discussion after labs are drawn  - Consider depo shot at that time, may need to avoid OTC as pt may be having migraines with visual aura although we need to get a better history to verify that diagnosis. Notable that these headaches only occur during menstruation.          Other Visit Diagnoses       Encounter for well child check without abnormal findings    -  Primary    Dietary counseling        Exercise counseling        Screening for depression        Encounter for screening involving social determinants of health (SDoH)        Family history of stroke        Relevant Orders    Lipid Profile              Vaccines up-to-date  Immunization History   Administered Date(s) Administered    DTAP/HIB/IPV Combined Vaccine 2011, 2011    DTaP/IPV/HepB Combined Vaccine 2011    Dtap Vaccine 08/21/2013, 02/18/2016    HIB Vaccine (ACTHIB/HIBERIX) 2011    HIB Vaccine PRP-OMP (PEDVAX) 06/04/2012    Hepatitis A Vaccine, Ped/Adol 06/04/2012, 08/21/2013    Hepatitis B Vaccine Adolescent/Pediatric 2011, 2011    INFLUENZA TIV (IM) 10/12/2012, 12/27/2013, 10/30/2014    IPV 02/18/2016    Influenza (IM) Preservative Free - HISTORICAL DATA 10/12/2012, 12/27/2013    Influenza Vaccine Quad Inj (Pf) 10/30/2014, 11/10/2016, 01/15/2019, 11/26/2019    Influenza Vaccine Quad Nasal 02/18/2016    MMR Vaccine 06/04/2012    MMR/Varicella Combined Vaccine 02/18/2016    Pneumococcal Conjugate Vaccine (Prevnar/PCV-13) 2011, 2011, 2011, 06/04/2012    Rotavirus Monovalent Vaccine (Rotarix) 2011, 2011     English Rotavirus Pentavalent Vaccine (Rotateq) 2011    Varicella Vaccine Live 06/04/2012

## 2024-07-30 NOTE — ED STATDOCS - PROGRESS NOTE DETAILS
Mannie Aleman DO (Attending): X-ray negative, patient is ambulating.  Will discharge patient to follow-up with orthopedics for likely MRI.

## 2024-07-30 NOTE — ED STATDOCS - PATIENT PORTAL LINK FT
You can access the FollowMyHealth Patient Portal offered by [facility] by registering at the following website: [email or web address]. By joining [email or web address]’s FollowMyHealth portal, you will also be able to view your health information using other applications (apps) compatible with our system.

## 2024-07-30 NOTE — ED STATDOCS - OBJECTIVE STATEMENT
38 year old female with PMHx of CHF, autism, anxiety and depression on Lexapro presents to ED c/o R knee pain x4 days. No recent falls. Patient reports previous injury to right knee. Endorsing pain with ambulation. Taking Motrin with brief relief. Denies numbness/tingling, fever, erythema or swelling of joint.

## 2024-07-30 NOTE — ED STATDOCS - CARE PROVIDER_API CALL
Sebastian Moreno  Orthopaedic Surgery  [address] [place] [id number]  Phone: [phone]  Fax: [phone]  Follow Up Time: 4-6 Days

## 2024-07-30 NOTE — ED STATDOCS - NSFOLLOWUPINSTRUCTIONS_ED_ALL_ED_FT
1.  Please follow-up with orthopedics, phone number was provided please call for appointment.  2.  Please take at 1000 mg acetaminophen and 600 mg of ibuprofen with food as needed for pain.  3.  You can use ice and elevation to help with pain and swelling.  4.  Please return to the ER if you develop fever, swelling of the joint, redness of the skin of your knee, difficulty bending your knee or bearing weight or anything of concern.

## 2024-07-30 NOTE — ED STATDOCS - PHYSICAL EXAMINATION
GEN: Patient awake alert NAD.   HEENT: normocephalic, atraumatic, EOMI, no scleral icterus, moist MM  CARDIAC: RRR, S1, S2, no murmur.   PULM: CTA B/L no wheeze, rhonchi, rales.   ABD: soft NT, ND, no rebound no guarding, no CVA tenderness.   MSK: Moving all extremities, no edema. 5/5 strength and full ROM in all extremities. tenderness to the medial joint line, no crepitus or deformity, no erythema or joint effusion  NEURO: A&Ox3, gait normal, no focal neurological deficits, CN 2-12 grossly intact  SKIN: warm, dry, no rash.

## 2024-08-06 ENCOUNTER — APPOINTMENT (OUTPATIENT)
Dept: ORTHOPEDIC SURGERY | Facility: CLINIC | Age: 39
End: 2024-08-06

## 2024-08-06 PROBLEM — M23.91 INTERNAL DERANGEMENT OF RIGHT KNEE: Status: ACTIVE | Noted: 2024-08-06

## 2024-08-06 PROCEDURE — 99204 OFFICE O/P NEW MOD 45 MIN: CPT

## 2024-08-06 NOTE — PHYSICAL EXAM
[de-identified] : Right lower extremity Hip: Normal ROM without pain on IR/ER Knee Inspection: no effusion Wounds: none Alignment: Neutral Palpation: Mildly tender palpation around medial joint line. ROM active (in degrees): 0-130 without crepitus or pain through the arc of motion Ligamentous laxity: stable to varus stress test, stable to valgus stress test Meniscal Test: negative McMurrays, negative Shamir Muscle Test: good quad strength [de-identified] : Outside x-rays reviewed-right knee-joint space well-preserved, no abnormalities

## 2024-08-06 NOTE — DISCUSSION/SUMMARY
[de-identified] : Right knee internal derangement  Extensive discussion of the natural history of this issue was had with the patient.  We discussed the treatment options focusing on conservative therapy which includes anti-inflammatories, physical therapy/home exercise, & activity modification.   If patient would like to try physical therapy she will let us know.   Patient will return if the pain returns or does not resolve.    The patient's current medication management of their orthopedic diagnosis was reviewed today: (1) We discussed a comprehensive treatment plan that included possible pharmaceutical management involving the use of prescription strength medications including but not limited to options such as oral Ibuprofen 400mg QID, once daily Meloxicam 15 mg, or 500-650 mg Tylenol versus over the counter oral medications and topical prescription NSAID Pennsaid vs over the counter Voltaren gel. (2) There is a moderate risk of morbidity with further treatment, especially from use of prescription strength medications and possible side effects of these medications which include upset stomach with oral medications, skin reactions to topical medications and cardiac/renal issues with long term use. (3) I recommended that the patient follow-up with their medical physician to discuss any significant specific potential issues with long term medication use such as interactions with current medications or with exacerbation of underlying medical comorbidities. (4) The benefits and risks associated with use of injectable, oral or topical, prescription and over the counter anti-inflammatory medications were discussed with the patient. The patient voiced understanding of the risks including but not limited to bleeding, stroke, kidney dysfunction, heart disease, and were referred to the black box warning label for further information.

## 2024-08-06 NOTE — HISTORY OF PRESENT ILLNESS
[de-identified] : 8/6/24: Patient presents with right knee pain that began on 7/26/2024.  Denies any inciting event.  Was seen at the hospital few days later.  States symptoms are intermittent, has pain on the medial aspect of the knee.  Denies buckling.  No rating pain numbness or tingling.  Taking Excedrin which does provide some relief.  Has been icing the knee as well which has helped.  Allergies to Geodon, denies anticoagulation tobacco use, PMH of sleep apnea

## 2024-08-07 ENCOUNTER — NON-APPOINTMENT (OUTPATIENT)
Age: 39
End: 2024-08-07

## 2024-08-21 ENCOUNTER — APPOINTMENT (OUTPATIENT)
Dept: INTERNAL MEDICINE | Facility: CLINIC | Age: 39
End: 2024-08-21
Payer: MEDICAID

## 2024-08-21 VITALS
OXYGEN SATURATION: 96 % | HEIGHT: 67 IN | SYSTOLIC BLOOD PRESSURE: 122 MMHG | HEART RATE: 75 BPM | DIASTOLIC BLOOD PRESSURE: 72 MMHG | WEIGHT: 236.6 LBS | BODY MASS INDEX: 37.13 KG/M2

## 2024-08-21 DIAGNOSIS — Z00.00 ENCOUNTER FOR GENERAL ADULT MEDICAL EXAMINATION W/OUT ABNORMAL FINDINGS: ICD-10-CM

## 2024-08-21 DIAGNOSIS — G47.33 OBSTRUCTIVE SLEEP APNEA (ADULT) (PEDIATRIC): ICD-10-CM

## 2024-08-21 DIAGNOSIS — F34.1 DYSTHYMIC DISORDER: ICD-10-CM

## 2024-08-21 DIAGNOSIS — E55.9 VITAMIN D DEFICIENCY, UNSPECIFIED: ICD-10-CM

## 2024-08-21 DIAGNOSIS — E66.9 OBESITY, UNSPECIFIED: ICD-10-CM

## 2024-08-21 PROCEDURE — 99214 OFFICE O/P EST MOD 30 MIN: CPT | Mod: GC,25

## 2024-08-21 PROCEDURE — G2211 COMPLEX E/M VISIT ADD ON: CPT | Mod: NC,1L

## 2024-08-21 PROCEDURE — 99395 PREV VISIT EST AGE 18-39: CPT | Mod: GC

## 2024-08-21 PROCEDURE — 99401 PREV MED CNSL INDIV APPRX 15: CPT

## 2024-08-22 LAB
25(OH)D3 SERPL-MCNC: 25.6 NG/ML
ANION GAP SERPL CALC-SCNC: 11 MMOL/L
BUN SERPL-MCNC: 10 MG/DL
CALCIUM SERPL-MCNC: 9.6 MG/DL
CHLORIDE SERPL-SCNC: 103 MMOL/L
CHOLEST SERPL-MCNC: 187 MG/DL
CO2 SERPL-SCNC: 25 MMOL/L
CREAT SERPL-MCNC: 0.75 MG/DL
EGFR: 104 ML/MIN/1.73M2
ESTIMATED AVERAGE GLUCOSE: 108 MG/DL
GLUCOSE SERPL-MCNC: 91 MG/DL
HBA1C MFR BLD HPLC: 5.4 %
HCT VFR BLD CALC: 39.8 %
HDLC SERPL-MCNC: 39 MG/DL
HGB BLD-MCNC: 12 G/DL
LDLC SERPL CALC-MCNC: 105 MG/DL
MCHC RBC-ENTMCNC: 29.1 PG
MCHC RBC-ENTMCNC: 30.2 GM/DL
MCV RBC AUTO: 96.6 FL
NONHDLC SERPL-MCNC: 148 MG/DL
PLATELET # BLD AUTO: 381 K/UL
POTASSIUM SERPL-SCNC: 4.3 MMOL/L
RBC # BLD: 4.12 M/UL
RBC # FLD: 14.7 %
SODIUM SERPL-SCNC: 138 MMOL/L
TRIGL SERPL-MCNC: 247 MG/DL
TSH SERPL-ACNC: 1.85 UIU/ML
WBC # FLD AUTO: 9.52 K/UL

## 2024-08-22 NOTE — HISTORY OF PRESENT ILLNESS
[FreeTextEntry1] : 37 y.o. female with h/o of high functioning Autism and depression who presents for CPE. Her main concern today is her sleeping habits.  [de-identified] : Pt states that for the past 3 months, she has been having trouble sleeping at night and feels tired during the day. She says that he uses her phone much of the night when she can't sleep and that she frequently wakes up to urinate. She says that she has small volumes of urine but feels the urge to go during sleep. Of note, she has been evaluated for sleep apnea and was told that she has it, but never received a CPAP machine, and was later told that she no longer has sleep apnea. She says that she doesn't recall waking up with SOB, but states that her boyfriend notices she does and that she snores loudly.

## 2024-08-22 NOTE — ASSESSMENT
[FreeTextEntry1] : 37 y.o. female with h/o of high functioning Autism and depression who presents for CPE. Her main concern today is her sleeping habits. Her problems sleeping at night seem like a combination of poor sleeping habits plus possible DIANA given the accounts from her boyfriend of waking up at night and loud snoring.  #rule out DIANA - previously diagnosed, unclear if still present  - Referral for sleep study - Counseled on good sleep habits  #Vit D deficiency - Pt has been taking vit D - Checked and still low while taking - Refilled vit D prescription  #Obesity #Hypercholesterolnemia - Discussed diet changes and exercise - No role for medication at this time - A1c 5.4%  #Depression - Per pt, well-controlled on medication - C/w Lexapro total 30mg QD  #HCM - Pap one year ago, pt states that it was normal. Given [facility] gyn [facility] - Mammogram at 39 - TDAP due 2025  RTC in 10 weeks to follow up on insomnia

## 2024-08-22 NOTE — HEALTH RISK ASSESSMENT
[No] : No [0] : 2) Feeling down, depressed, or hopeless: Not at all (0) [PHQ-2 Negative - No further assessment needed] : PHQ-2 Negative - No further assessment needed [PHQ-9 Negative - No further assessment needed] : PHQ-9 Negative - No further assessment needed [Never] : Never

## 2024-09-11 ENCOUNTER — APPOINTMENT (OUTPATIENT)
Dept: OBGYN | Facility: CLINIC | Age: 39
End: 2024-09-11

## 2024-10-21 ENCOUNTER — NON-APPOINTMENT (OUTPATIENT)
Age: 39
End: 2024-10-21

## 2024-10-30 ENCOUNTER — NON-APPOINTMENT (OUTPATIENT)
Age: 39
End: 2024-10-30

## 2024-10-30 ENCOUNTER — APPOINTMENT (OUTPATIENT)
Dept: INTERNAL MEDICINE | Facility: CLINIC | Age: 39
End: 2024-10-30

## 2024-11-29 NOTE — ED ADULT NURSE NOTE - MODE OF DISCHARGE
Ambulatory Patient requests all Lab, Cardiology, and Radiology Results on their Discharge Instructions

## 2024-12-04 ENCOUNTER — APPOINTMENT (OUTPATIENT)
Dept: SLEEP CENTER | Facility: CLINIC | Age: 39
End: 2024-12-04

## 2024-12-04 ENCOUNTER — OUTPATIENT (OUTPATIENT)
Dept: OUTPATIENT SERVICES | Facility: HOSPITAL | Age: 39
LOS: 1 days | End: 2024-12-04
Payer: MEDICAID

## 2024-12-04 DIAGNOSIS — Z98.89 OTHER SPECIFIED POSTPROCEDURAL STATES: Chronic | ICD-10-CM

## 2024-12-04 DIAGNOSIS — Z95.5 PRESENCE OF CORONARY ANGIOPLASTY IMPLANT AND GRAFT: Chronic | ICD-10-CM

## 2024-12-04 PROCEDURE — 95810 POLYSOM 6/> YRS 4/> PARAM: CPT

## 2024-12-04 PROCEDURE — 95810 POLYSOM 6/> YRS 4/> PARAM: CPT | Mod: 26

## 2024-12-05 DIAGNOSIS — G47.33 OBSTRUCTIVE SLEEP APNEA (ADULT) (PEDIATRIC): ICD-10-CM

## 2024-12-25 ENCOUNTER — NON-APPOINTMENT (OUTPATIENT)
Age: 39
End: 2024-12-25

## 2025-01-12 ENCOUNTER — EMERGENCY (EMERGENCY)
Facility: HOSPITAL | Age: 40
LOS: 0 days | Discharge: HOME | End: 2025-01-12
Attending: EMERGENCY MEDICINE
Payer: MEDICAID

## 2025-01-12 VITALS
HEART RATE: 66 BPM | RESPIRATION RATE: 14 BRPM | WEIGHT: 184.97 LBS | SYSTOLIC BLOOD PRESSURE: 125 MMHG | OXYGEN SATURATION: 98 % | TEMPERATURE: 98 F | DIASTOLIC BLOOD PRESSURE: 55 MMHG

## 2025-01-12 DIAGNOSIS — K08.89 OTHER SPECIFIED DISORDERS OF TEETH AND SUPPORTING STRUCTURES: ICD-10-CM

## 2025-01-12 DIAGNOSIS — Z88.8 ALLERGY STATUS TO OTHER DRUGS, MEDICAMENTS AND BIOLOGICAL SUBSTANCES: ICD-10-CM

## 2025-01-12 DIAGNOSIS — F84.0 AUTISTIC DISORDER: ICD-10-CM

## 2025-01-12 DIAGNOSIS — I11.0 HYPERTENSIVE HEART DISEASE WITH HEART FAILURE: ICD-10-CM

## 2025-01-12 DIAGNOSIS — Z95.5 PRESENCE OF CORONARY ANGIOPLASTY IMPLANT AND GRAFT: Chronic | ICD-10-CM

## 2025-01-12 DIAGNOSIS — Z98.89 OTHER SPECIFIED POSTPROCEDURAL STATES: Chronic | ICD-10-CM

## 2025-01-12 DIAGNOSIS — I50.9 HEART FAILURE, UNSPECIFIED: ICD-10-CM

## 2025-01-12 DIAGNOSIS — F32.A DEPRESSION, UNSPECIFIED: ICD-10-CM

## 2025-01-12 PROCEDURE — 99283 EMERGENCY DEPT VISIT LOW MDM: CPT

## 2025-01-12 RX ORDER — PENICILLIN V POTASSIUM 250 MG/1
500 TABLET, FILM COATED ORAL ONCE
Refills: 0 | Status: COMPLETED | OUTPATIENT
Start: 2025-01-12 | End: 2025-01-12

## 2025-01-12 RX ORDER — ACETAMINOPHEN 80 MG/.8ML
650 SOLUTION/ DROPS ORAL ONCE
Refills: 0 | Status: COMPLETED | OUTPATIENT
Start: 2025-01-12 | End: 2025-01-12

## 2025-01-12 RX ORDER — PENICILLIN V POTASSIUM 250 MG/1
1 TABLET, FILM COATED ORAL
Qty: 28 | Refills: 0
Start: 2025-01-12 | End: 2025-01-18

## 2025-01-12 RX ADMIN — PENICILLIN V POTASSIUM 500 MILLIGRAM(S): 250 TABLET, FILM COATED ORAL at 15:04

## 2025-01-12 RX ADMIN — ACETAMINOPHEN 650 MILLIGRAM(S): 80 SOLUTION/ DROPS ORAL at 15:04

## 2025-01-12 NOTE — ED STATDOCS - PROGRESS NOTE DETAILS
39-year-old female with a past medical history of CHF, high blood pressure, autism, anxiety, depression presents with right lower tooth pain status post tooth extraction of #31 on Friday.  Patient was told that she needed extracted because it was infected and was told to only take ibuprofen for the pain.  After dose and the patient was taken at 12 PM this afternoon and states that is not helping.  Patient was not prescribed antibiotics after the extraction.  + Extraction noted to tooth #31 (right lower molar) with mild erythema.  No dental abscesses noted.  No tenderness to palpation to the surrounding teeth.  Will prescribe antibiotics and advised patient to take ibuprofen/Advil with Tylenol and to call the dentist tomorrow to schedule an earlier follow-up appointment.  Patient is aware and agrees with plan. -Orestes Hawthorne PA-C

## 2025-01-12 NOTE — ED ADULT TRIAGE NOTE - HEART RATE (BEATS/MIN)
Patient OOB/AMB, tolerating PO intake. Pain controlled with PRN Pain medication. Voiding freely. Cleared by anesthesiologist to go home. GYN team cleared patient to go home. 65

## 2025-01-12 NOTE — ED STATDOCS - NSFOLLOWUPINSTRUCTIONS_ED_ALL_ED_FT
Follow up with your dentist sooner. Call to set up an earlier appointment.   Take advil and tylenol for pain.   Start the antibiotic and take as directed.     Return to the Emergency Department for worsening or persistent symptoms, and/or ANY NEW OR CONCERNING SYMPTOMS. If you have issues obtaining follow up, please call: [phone] or [phone]  to obtain a doctor or specialist who takes your insurance in your area.       Toothache    WHAT YOU NEED TO KNOW:    What is a toothache and what causes it? A toothache is pain that is caused by irritation of the nerves in the center of your tooth. The irritation may be caused by several problems, such as a cavity, an infection, a cracked tooth, or gum disease.  Tooth Anatomy    How is a toothache diagnosed? Your healthcare provider will ask how long you have had a toothache. He or she will also ask if you have any other symptoms or medical conditions. Your healthcare provider will examine your tooth and mouth. Your provider may also examine your face and neck. You may need x-rays to check for an infection or cracked tooth.    How is a toothache treated? Treatment depends on the cause of your toothache. You may need any of the following:    NSAIDs, such as ibuprofen, help decrease swelling, pain, and fever. This medicine is available with or without a doctor's order. NSAIDs can cause stomach bleeding or kidney problems in certain people. If you take blood thinner medicine, always ask if NSAIDs are safe for you. Always read the medicine label and follow directions. Do not give these medicines to children younger than 6 months without direction from a healthcare provider.    Acetaminophen decreases pain and fever. It is available without a doctor's order. Ask how much to take and how often to take it. Follow directions. Acetaminophen can cause liver damage if not taken correctly.    Prescription pain medicine may be given. Ask your healthcare provider how to take this medicine safely. Some prescription pain medicines contain acetaminophen. Do not take other medicines that contain acetaminophen without talking to your healthcare provider. Too much acetaminophen may cause liver damage. Prescription pain medicine may cause constipation. Ask your healthcare provider how to prevent or treat constipation.    Antibiotics help treat or prevent a bacterial infection.  How can I manage my toothache?    Rinse your mouth with warm salt water 4 times a day or as directed.    Eat soft foods to help relieve pain caused by chewing.    Apply ice on your jaw or cheek for 15 to 20 minutes every hour or as directed. Use an ice pack, or put crushed ice in a plastic bag. Cover it with a towel before you apply it. Ice helps prevent tissue damage and decreases swelling and pain.  How can I help prevent a toothache?    Brush your teeth at least 2 times a day.    Use dental floss to clean between your teeth at least 1 time a day.    See your dentist regularly every 6 months for dental cleanings and oral exams.  When should I seek immediate care?    You have trouble breathing or swallowing.    You have swelling in your face or neck.  When should I contact my dentist?    You have a fever and chills.    You have trouble opening or closing your mouth.    You have swelling around your tooth.    You have questions or concerns about your condition or care.

## 2025-01-12 NOTE — ED ADULT NURSE NOTE - OBJECTIVE STATEMENT
Pt is 38 yo female c/o right molar pain. Pt had molar work done on Friday and has had pain since then. Pt was tod there was an infection, was not perscribed antibiotics. PMHx autism and CHF.

## 2025-01-12 NOTE — ED ADULT TRIAGE NOTE - CHIEF COMPLAINT QUOTE
pt BIBEMS from home c/o right sided tooth pain. pt notes she had tooth pulled on Friday. no abx taken prior to appointment and was sent home with ibuprofen which has not been working. pt denies bleeding, drainage, fevers. no swelling noted to face.

## 2025-01-12 NOTE — ED STATDOCS - PHYSICAL EXAMINATION
Constitutional: NAD AAOx3  Eyes: PERRLA EOMI  Head: Normocephalic atraumatic  Mouth: MMM, R lower tooth 31 extracted with mild erythema of gum  Cardiac: regular rate   Resp: unlabored breathing  GI: Abd s/nt/nd  Neuro: grossly normal and intact  Skin: No visible rashes

## 2025-01-12 NOTE — ED STATDOCS - OBJECTIVE STATEMENT
38 y/o female with PMHx of CHF, autism, EtOH abuse, anxiety and depression on Lexapro presents to ED c/o R sided dental pain at site of recent extraction on 1/10. She also reports profuse sweating as well. Pt had a tooth pulled due to infection, was not prescribed any ABX. Has been taking Ibuprofen, taken today at 12p without relief. Pt has appt with dentist on 1/24. Denies fever, difficulty breathing, SOB, dysphagia.

## 2025-01-12 NOTE — ED STATDOCS - PATIENT PORTAL LINK FT
You can access the FollowMyHealth Patient Portal offered by [facility] by registering at the following website: [email or web address]. By joining [facility]’s FollowMyHealth portal, you will also be able to view your health information using other applications (apps) compatible with our system.

## 2025-06-27 NOTE — ED ADULT NURSE NOTE - NSHOSCREENINGQ1_ED_ALL_ED
Problem: DISCHARGE PLANNING  Goal: Discharge to home or other facility with appropriate resources  Description: INTERVENTIONS:  - Identify barriers to discharge w/patient and caregiver  - Arrange for needed discharge resources and transportation as appropriate  - Identify discharge learning needs (meds, wound care, etc.)  - Arrange for interpretive services to assist at discharge as needed  - Refer to Case Management Department for coordinating discharge planning if the patient needs post-hospital services based on physician/advanced practitioner order or complex needs related to functional status, cognitive ability, or social support system  Outcome: Progressing     Problem: BEHAVIOR  Goal: Pt/Family maintain appropriate behavior and adhere to behavioral management agreement, if implemented  Description: INTERVENTIONS:  - Assess the family dynamic   - Encourage verbalization of thoughts and concerns in a socially appropriate manner  - Assess patient/family's coping skills and non-compliant behavior (including use of illegal substances).  - Utilize positive, consistent limit setting strategies supporting safety of patient, staff and others  - Initiate consult with Case Management, Spiritual Care or other ancillary services as appropriate  - If a patient's/visitor's behavior jeopardizes the safety of the patient, staff, or others, refer to organization procedure.   - Notify Security of behavior or suspected illegal substances which indicate the need for search of the patient and/or belongings  - Encourage participation in the decision making process about a behavioral management agreement; implement if patient meets criteria  Outcome: Progressing     Problem: ANXIETY  Goal: Will report anxiety at manageable levels  Description: INTERVENTIONS:  - Administer medication as ordered  - Teach and encourage coping skills  - Provide emotional support  - Assess patient/family for anxiety and ability to cope  Outcome:  Progressing  Goal: By discharge: Patient will verbalize 2 strategies to deal with anxiety  Description: Interventions:  - Identify any obvious source/trigger to anxiety  - Staff will assist patient in applying identified coping technique/skills  - Encourage attendance of scheduled groups and activities  Outcome: Progressing     Problem: Ineffective Coping  Goal: Participates in unit activities  Description: Interventions:  - Provide therapeutic environment   - Provide required programming   - Redirect inappropriate behaviors   Outcome: Progressing      No